# Patient Record
Sex: FEMALE | Race: WHITE | Employment: UNEMPLOYED | URBAN - METROPOLITAN AREA
[De-identification: names, ages, dates, MRNs, and addresses within clinical notes are randomized per-mention and may not be internally consistent; named-entity substitution may affect disease eponyms.]

---

## 2017-01-02 ENCOUNTER — HOSPITAL ENCOUNTER (EMERGENCY)
Age: 37
Discharge: HOME | End: 2017-01-04
Attending: EMERGENCY MEDICINE | Admitting: OBSTETRICS & GYNECOLOGY
Payer: MEDICAID

## 2017-01-02 LAB
ABO + RH BLD: NORMAL
ALBUMIN SERPL BCP-MCNC: 2 G/DL (ref 3.5–5)
ALBUMIN/GLOB SERPL: 0.5 {RATIO} (ref 1.1–2.2)
ALP SERPL-CCNC: 242 U/L (ref 45–117)
ALT SERPL-CCNC: 14 U/L (ref 12–78)
AMPHET UR QL SCN: POSITIVE
ANION GAP BLD CALC-SCNC: 10 MMOL/L (ref 5–15)
AST SERPL W P-5'-P-CCNC: 15 U/L (ref 15–37)
BARBITURATES UR QL SCN: NEGATIVE
BASOPHILS # BLD AUTO: 0 K/UL (ref 0–0.1)
BASOPHILS # BLD: 0 % (ref 0–1)
BENZODIAZ UR QL: NEGATIVE
BILIRUB SERPL-MCNC: 0.3 MG/DL (ref 0.2–1)
BLOOD GROUP ANTIBODIES SERPL: NORMAL
BUN SERPL-MCNC: 9 MG/DL (ref 6–20)
BUN/CREAT SERPL: 12 (ref 12–20)
CALCIUM SERPL-MCNC: 8.4 MG/DL (ref 8.5–10.1)
CANNABINOIDS UR QL SCN: NEGATIVE
CHLORIDE SERPL-SCNC: 98 MMOL/L (ref 97–108)
CO2 SERPL-SCNC: 24 MMOL/L (ref 21–32)
COCAINE UR QL SCN: NEGATIVE
CREAT SERPL-MCNC: 0.77 MG/DL (ref 0.55–1.02)
DRUG SCRN COMMENT,DRGCM: ABNORMAL
EOSINOPHIL # BLD: 0 K/UL (ref 0–0.4)
EOSINOPHIL NFR BLD: 0 % (ref 0–7)
ERYTHROCYTE [DISTWIDTH] IN BLOOD BY AUTOMATED COUNT: 14.1 % (ref 11.5–14.5)
EST. AVERAGE GLUCOSE BLD GHB EST-MCNC: 123 MG/DL
GLOBULIN SER CALC-MCNC: 3.7 G/DL (ref 2–4)
GLUCOSE SERPL-MCNC: 180 MG/DL (ref 65–100)
HBA1C MFR BLD: 5.9 % (ref 4.2–6.3)
HCT VFR BLD AUTO: 36.1 % (ref 35–47)
HGB BLD-MCNC: 11.3 G/DL (ref 11.5–16)
LYMPHOCYTES # BLD AUTO: 6 % (ref 12–49)
LYMPHOCYTES # BLD: 1 K/UL (ref 0.8–3.5)
MCH RBC QN AUTO: 29 PG (ref 26–34)
MCHC RBC AUTO-ENTMCNC: 31.3 G/DL (ref 30–36.5)
MCV RBC AUTO: 92.6 FL (ref 80–99)
METHADONE UR QL: NEGATIVE
MONOCYTES # BLD: 0.7 K/UL (ref 0–1)
MONOCYTES NFR BLD AUTO: 4 % (ref 5–13)
NEUTS SEG # BLD: 16.3 K/UL (ref 1.8–8)
NEUTS SEG NFR BLD AUTO: 90 % (ref 32–75)
OPIATES UR QL: NEGATIVE
PCP UR QL: NEGATIVE
PLATELET # BLD AUTO: 298 K/UL (ref 150–400)
POTASSIUM SERPL-SCNC: 4.3 MMOL/L (ref 3.5–5.1)
PROT SERPL-MCNC: 5.7 G/DL (ref 6.4–8.2)
RBC # BLD AUTO: 3.9 M/UL (ref 3.8–5.2)
SODIUM SERPL-SCNC: 132 MMOL/L (ref 136–145)
SPECIMEN EXP DATE BLD: NORMAL
WBC # BLD AUTO: 18 K/UL (ref 3.6–11)

## 2017-01-02 PROCEDURE — 87491 CHLMYD TRACH DNA AMP PROBE: CPT | Performed by: OBSTETRICS & GYNECOLOGY

## 2017-01-02 PROCEDURE — 86592 SYPHILIS TEST NON-TREP QUAL: CPT | Performed by: OBSTETRICS & GYNECOLOGY

## 2017-01-02 PROCEDURE — 75410000003 HC RECOV DEL/VAG/CSECN EA 0.5 HR: Performed by: OBSTETRICS & GYNECOLOGY

## 2017-01-02 PROCEDURE — 36415 COLL VENOUS BLD VENIPUNCTURE: CPT | Performed by: OBSTETRICS & GYNECOLOGY

## 2017-01-02 PROCEDURE — 86762 RUBELLA ANTIBODY: CPT | Performed by: OBSTETRICS & GYNECOLOGY

## 2017-01-02 PROCEDURE — 87340 HEPATITIS B SURFACE AG IA: CPT | Performed by: OBSTETRICS & GYNECOLOGY

## 2017-01-02 PROCEDURE — 83036 HEMOGLOBIN GLYCOSYLATED A1C: CPT | Performed by: OBSTETRICS & GYNECOLOGY

## 2017-01-02 PROCEDURE — 59409 OBSTETRICAL CARE: CPT

## 2017-01-02 PROCEDURE — 74011250636 HC RX REV CODE- 250/636: Performed by: OBSTETRICS & GYNECOLOGY

## 2017-01-02 PROCEDURE — 75810000280 HC DELIVERY OF PLACENTA

## 2017-01-02 PROCEDURE — 80053 COMPREHEN METABOLIC PANEL: CPT | Performed by: OBSTETRICS & GYNECOLOGY

## 2017-01-02 PROCEDURE — 87389 HIV-1 AG W/HIV-1&-2 AB AG IA: CPT | Performed by: OBSTETRICS & GYNECOLOGY

## 2017-01-02 PROCEDURE — 85025 COMPLETE CBC W/AUTO DIFF WBC: CPT | Performed by: OBSTETRICS & GYNECOLOGY

## 2017-01-02 PROCEDURE — 74011250637 HC RX REV CODE- 250/637: Performed by: OBSTETRICS & GYNECOLOGY

## 2017-01-02 PROCEDURE — 99283 EMERGENCY DEPT VISIT LOW MDM: CPT

## 2017-01-02 PROCEDURE — 88307 TISSUE EXAM BY PATHOLOGIST: CPT | Performed by: EMERGENCY MEDICINE

## 2017-01-02 PROCEDURE — 96365 THER/PROPH/DIAG IV INF INIT: CPT

## 2017-01-02 PROCEDURE — 86803 HEPATITIS C AB TEST: CPT | Performed by: OBSTETRICS & GYNECOLOGY

## 2017-01-02 PROCEDURE — 86850 RBC ANTIBODY SCREEN: CPT | Performed by: OBSTETRICS & GYNECOLOGY

## 2017-01-02 PROCEDURE — 87798 DETECT AGENT NOS DNA AMP: CPT | Performed by: OBSTETRICS & GYNECOLOGY

## 2017-01-02 PROCEDURE — 80307 DRUG TEST PRSMV CHEM ANLYZR: CPT | Performed by: OBSTETRICS & GYNECOLOGY

## 2017-01-02 RX ORDER — ONDANSETRON 4 MG/1
4 TABLET, ORALLY DISINTEGRATING ORAL
Status: DISCONTINUED | OUTPATIENT
Start: 2017-01-02 | End: 2017-01-04 | Stop reason: HOSPADM

## 2017-01-02 RX ORDER — HYDROCORTISONE ACETATE PRAMOXINE HCL 2.5; 1 G/100G; G/100G
CREAM TOPICAL AS NEEDED
Status: DISCONTINUED | OUTPATIENT
Start: 2017-01-02 | End: 2017-01-04 | Stop reason: HOSPADM

## 2017-01-02 RX ORDER — SIMETHICONE 80 MG
80 TABLET,CHEWABLE ORAL
Status: DISCONTINUED | OUTPATIENT
Start: 2017-01-02 | End: 2017-01-04 | Stop reason: HOSPADM

## 2017-01-02 RX ORDER — IBUPROFEN 800 MG/1
800 TABLET ORAL EVERY 8 HOURS
Status: DISCONTINUED | OUTPATIENT
Start: 2017-01-02 | End: 2017-01-04 | Stop reason: HOSPADM

## 2017-01-02 RX ORDER — OXYTOCIN/RINGER'S LACTATE 20/1000 ML
125-500 PLASTIC BAG, INJECTION (ML) INTRAVENOUS ONCE
Status: ACTIVE | OUTPATIENT
Start: 2017-01-02 | End: 2017-01-02

## 2017-01-02 RX ORDER — DOCUSATE SODIUM 100 MG/1
100 CAPSULE, LIQUID FILLED ORAL
Status: DISCONTINUED | OUTPATIENT
Start: 2017-01-02 | End: 2017-01-04 | Stop reason: HOSPADM

## 2017-01-02 RX ORDER — OXYTOCIN IN 5 % DEXTROSE 30/500 ML
999 PLASTIC BAG, INJECTION (ML) INTRAVENOUS
Status: COMPLETED | OUTPATIENT
Start: 2017-01-02 | End: 2017-01-02

## 2017-01-02 RX ORDER — SWAB
1 SWAB, NON-MEDICATED MISCELLANEOUS DAILY
Status: DISCONTINUED | OUTPATIENT
Start: 2017-01-03 | End: 2017-01-04 | Stop reason: HOSPADM

## 2017-01-02 RX ORDER — NALOXONE HYDROCHLORIDE 0.4 MG/ML
0.4 INJECTION, SOLUTION INTRAMUSCULAR; INTRAVENOUS; SUBCUTANEOUS AS NEEDED
Status: DISCONTINUED | OUTPATIENT
Start: 2017-01-02 | End: 2017-01-04 | Stop reason: HOSPADM

## 2017-01-02 RX ORDER — IBUPROFEN 200 MG
800 TABLET ORAL
COMMUNITY
End: 2017-01-04

## 2017-01-02 RX ORDER — LANOLIN ALCOHOL/MO/W.PET/CERES
1 CREAM (GRAM) TOPICAL
Status: DISCONTINUED | OUTPATIENT
Start: 2017-01-03 | End: 2017-01-04 | Stop reason: HOSPADM

## 2017-01-02 RX ADMIN — IBUPROFEN 800 MG: 800 TABLET, FILM COATED ORAL at 19:28

## 2017-01-02 RX ADMIN — Medication 999 ML/HR: at 11:06

## 2017-01-02 NOTE — ED PROVIDER NOTES
HPI Comments: 29 y.o. female with past medical history significant for hx of chlamydia and cocaine abuse who presents from home via EMS with chief complaint of abdominal cramping. Pt complains of abdominal cramping with associated vaginal bleeding and abdominal distension that began last night. Pt reports LMP was ~3 months PTA. Pt has hx of 1  and 1 vaginal delivery. G/P/A: 3/2/0. There are no other acute medical concerns at this time. Precipitious Delivery-10:51 AM.     Social hx: (+) tobacco smoker; occasional EtOH     Note written by Beronica Castillo. Marianne Bhatt, as dictated by Jania Martinez MD 10:58 AM      The history is provided by the patient. No  was used. Past Medical History:   Diagnosis Date    History of cocaine use     Hx of abnormal Pap smear     Hx of chlamydia infection        Past Surgical History:   Procedure Laterality Date    Hx  section           No family history on file. Social History     Social History    Marital status:      Spouse name: N/A    Number of children: N/A    Years of education: N/A     Occupational History    Not on file. Social History Main Topics    Smoking status: Current Every Day Smoker     Packs/day: 1.00     Years: 15.00    Smokeless tobacco: Not on file    Alcohol use Yes    Drug use: Yes    Sexual activity: Yes     Partners: Male     Birth control/ protection: None     Other Topics Concern    Not on file     Social History Narrative         ALLERGIES: Latex and Codeine    Review of Systems   Constitutional: Negative for fever. Gastrointestinal: Positive for abdominal pain (w/ distension). Genitourinary: Positive for vaginal bleeding. Vitals:    17 1055   BP: 138/86   Pulse: 73   Resp: 19   Temp: 97.7 °F (36.5 °C)   SpO2: 100%   Weight: 54.9 kg (121 lb)   Height: 5' 6\" (1.676 m)            Physical Exam   Constitutional: She is oriented to person, place, and time.  She appears well-developed and well-nourished. Uncomfortable appearing, AxOx4, speaking in complete sentences, appears gravid;    HENT:   Head: Normocephalic and atraumatic. Nose: Nose normal.   Mouth/Throat: Oropharynx is clear and moist.   Eyes: Conjunctivae and EOM are normal. Pupils are equal, round, and reactive to light. Right eye exhibits no discharge. Left eye exhibits no discharge. No scleral icterus. Neck: Normal range of motion. Neck supple. No JVD present. No tracheal deviation present. Cardiovascular: Normal rate, regular rhythm, normal heart sounds and intact distal pulses. Exam reveals no gallop and no friction rub. No murmur heard. Pulmonary/Chest: Effort normal and breath sounds normal. No respiratory distress. She has no wheezes. She has no rales. She exhibits no tenderness. Abdominal: Soft. Bowel sounds are normal. There is no tenderness. There is no rebound and no guarding. Gravid/ nttp   Genitourinary: No vaginal discharge found. Genitourinary Comments: Pt denies urinary  Complaints    NEFG/ Noted tuft of hair/ vb; baby delivered;    Musculoskeletal: Normal range of motion. She exhibits no edema or tenderness. Neurological: She is alert and oriented to person, place, and time. No cranial nerve deficit. She exhibits normal muscle tone. Coordination normal.   Skin: Skin is warm and dry. No rash noted. No erythema. No pallor. Psychiatric: She has a normal mood and affect. Her behavior is normal. Thought content normal.   Nursing note and vitals reviewed.        [facility]  ED Course       Procedures    Procedure Note - Delivery Note:    10:59 AM  Performed by: Kelly Price MD  Chaperoned by: Nurse  Pelvic exam was performed/ noted tuft of hair/ no cord around neck/  precipitious delivery of apparent term male baby/ Apgar 6-8 at 1min; Cord clamped/ cut/  nose/ mouth suctioned; code white called;      12:16 PM  Patient is being evaluated for admission to the hospital by the [facility] hospitalist/ Baby to the NICU. The results of their tests and reasons for their admission have been discussed with them and/or available family. They convey agreement and understanding for the need to be admitted and for their admission diagnosis. Consultation has been made with the inpatient physician specialist for hospitalization.

## 2017-01-02 NOTE — PROGRESS NOTES
1125- Patient arrived on unit after code white was called and patient delivered in ER. Fundus check was performed on admission, firm at U with small rubra bleeding.

## 2017-01-02 NOTE — IP AVS SNAPSHOT
303 [facility] 
 
 
 [address] 
[phone] Patient: Will Stephens MRN: [id number] RYL:9/17/1799 You are allergic to the following Allergen Reactions Latex Rash Codeine Rash Immunizations Administered for This Admission Name Date Influenza Vaccine (Quad) PF 1/4/2017 Recent Documentation Height Weight BMI OB Status Smoking Status 1.676 m 54.9 kg 19.53 kg/m2 Pregnant Current Every Day Smoker Unresulted Labs Order Current Status Joce Amend / GC AMPLIFICATION In process VARICELLA ZOSTER BY PCR In process Emergency Contacts Name Discharge Info Relation Home Work Mobile Lloyd Castano DISCHARGE CAREGIVER [3] Spouse [3] [phone] About your hospitalization You were admitted on:  N/A You last received care in the:  [facility] 2 LABOR & DELIVERY You were discharged on:  January 4, 2017 Unit phone number:  [phone] Why you were hospitalized Your primary diagnosis was:  Not on File Providers Seen During Your Hospitalizations Provider Role Specialty Primary office phone Obdulio Booth MD Attending Provider Emergency Medicine [phone] Jessi Key MD Attending Provider Obstetrics & Gynecology [phone] Your Primary Care Physician (PCP) Primary Care Physician Office Phone Office Fax NONE ** None ** ** None ** Follow-up Information Follow up With Details Comments Contact Info Serafin Caro MD Schedule an appointment as soon as possible for a visit in 2 weeks early post-partum visit due to complicated hx. Need to follow up labs and BP.  [address] [facility] [address] 
[phone] [address] [address] Schedule an appointment as soon as possible for a visit in 6 weeks  Michelle Elena [address] 
[phone] Current Discharge Medication List  
  
START taking these medications Dose & Instructions Dispensing Information Comments Morning Noon Evening Bedtime  
 docusate sodium 100 mg capsule Commonly known as:  Gevena Living Your next dose is: Today, Tomorrow Other:  _________ Dose:  100 mg Take 1 Cap by mouth two (2) times daily as needed for Constipation for up to 90 days. Quantity:  30 Cap Refills:  0  
     
   
   
   
  
 ferrous sulfate 325 mg (65 mg iron) tablet Your next dose is: Today, Tomorrow Other:  _________ Dose:  325 mg Take 1 Tab by mouth three (3) times daily (with meals). Quantity:  60 Tab Refills:  2  
     
   
   
   
  
 prenatal vit-iron fumarate-fa 28 mg iron- 800 mcg Tab Commonly known as:  PRENATAL PLUS with IRON Your next dose is: Today, Tomorrow Other:  _________ Dose:  1 Tab Take 1 Tab by mouth daily. Quantity:  60 Tab Refills:  1 CONTINUE these medications which have CHANGED Dose & Instructions Dispensing Information Comments Morning Noon Evening Bedtime  
 ibuprofen 800 mg tablet Commonly known as:  MOTRIN What changed:   
- medication strength - when to take this Your next dose is: Today, Tomorrow Other:  _________ Dose:  800 mg Take 1 Tab by mouth every eight (8) hours. Quantity:  20 Tab Refills:  0 STOP taking these medications   
 azithromycin 250 mg tablet Commonly known as:  ZITHROMAX  
   
  
 calcium carbonate 200 mg calcium (500 mg) Chew Commonly known as:  TUMS  
   
  
 oxyCODONE-acetaminophen 5-325 mg per tablet Commonly known as:  PERCOCET  
   
  
 PRENATAL DHA+COMPLETE PRENATAL -300 mg-mcg-mg Cmpk Generic drug:  PNV no.24-iron-folic acid-dha  
   
  
 TYLENOL 325 mg tablet Generic drug:  acetaminophen Where to Get Your Medications Information on where to get these meds will be given to you by the nurse or doctor. ! Ask your nurse or doctor about these medications  
  docusate sodium 100 mg capsule  
 ferrous sulfate 325 mg (65 mg iron) tablet  
 ibuprofen 800 mg tablet  
 prenatal vit-iron fumarate-fa 28 mg iron- 800 mcg Tab Discharge Instructions Patient Discharge Instructions Melody Simon Boxer / [id number] : 1980 Admitted 2017 Discharged: 2017 Please bring this form with you to show your care provider at your follow-up appointment. Primary care provider:  None Discharging provider:  Maru Graf MD  - Family Medicine Resident [address] [facility] Hospitalist Attending ACUTE DIAGNOSES: 
Maternity FOLLOW-UP CARE RECOMMENDATIONS: 
 
Follow-up Information Follow up With Details Comments Contact Info Carmelina Vivas MD Schedule an appointment as soon as possible for a visit in 2 weeks early post-partum visit due to complicated hx. Need to follow up labs and BP.  [address] [facility] [address] 
[phone] [address] [address] Schedule an appointment as soon as possible for a visit in 6 weeks  Michelle Popsus [address] 
[phone] Continuing Therapy: 
- Please continue Motrin 800 mg, 1 tablet every 8 hours for the next 2 days, then 1 tablet every 8 hours as needed for pain - Please continue Colace 100 mg for constipation, take one tablet BID until having regular bowel movements - Please start Ferrous Sulfate 325 mg for anemia, Take 1 tablet 2 times daily - Please continue your prenatal vitamins Follow-up tests needed: BP check necessary as pt had elevated BP. CBC for anemia. Further tests to be determined by PCP/OB Pending test results: At the time of your discharge the following test results are still pending: varicella, GC/chlamydia, HepBsAg, HepCAb Please make sure you review these results with your outpatient follow-up provider(s). Specific symptoms to watch for: chest pain, shortness of breath, fever, chills, nausea, vomiting, diarrhea, change in mentation, falling, weakness, bleeding. DIET/what to eat:  Regular Diet ACTIVITY:  
Activity Instructions Do not lift anything heavier than your baby for 6 weeks. Nothing in the vagina for 6 weeks. You are ok to drive as long as you are not taking Percocet or other narcotic pain medication (Motrin is ok). Wound care: None I understand that if any problems occur once I am at home I am to contact my physician. These instructions were explained to me and I had the opportunity to ask questions. I understand and acknowledge receipt of the instructions indicated above. Physician's or R.N.'s Signature                                                                  Date/Time Patient or Representative Signature                                                          Date/Time Vaginal Childbirth: Care Instructions Your Care Instructions Your body will slowly heal in the next few weeks. It is easy to get too tired and overwhelmed during the first weeks after your baby is born. Changes in your hormones can shift your mood without warning. You may find it hard to meet the extra demands on your energy and time. Take it easy on yourself. Follow-up care is a key part of your treatment and safety. Be sure to make and go to all appointments, and call your doctor if you are having problems. It's also a good idea to know your test results and keep a list of the medicines you take. How can you care for yourself at home? · Vaginal bleeding and cramps ¨ After delivery, you will have a bloody discharge from the vagina. This will turn pink within a week and then white or yellow after about 10 days. It may last for 2 to 4 weeks or longer, until the uterus has healed. Use pads instead of tampons until you stop bleeding. ¨ Do not worry if you pass some blood clots, as long as they are smaller than a golf ball. If you have a tear or stitches in your vaginal area, change the pad at least every 4 hours to prevent soreness and infection. ¨ You may have cramps for the first few days after childbirth. These are normal and occur as the uterus shrinks to normal size. Take an over-the-counter pain medicine, such as acetaminophen (Tylenol), ibuprofen (Advil, Motrin), or naproxen (Aleve), for cramps. Read and follow all instructions on the label. Do not take aspirin, because it can cause more bleeding. ¨ Do not take two or more pain medicines at the same time unless the doctor told you to. Many pain medicines have acetaminophen, which is Tylenol. Too much acetaminophen (Tylenol) can be harmful. · Stitches ¨ If you have stitches, they will dissolve on their own and do not need to be removed. Follow your doctor's instructions for cleaning the stitched area. ¨ Put ice or a cold pack on your painful area for 10 to 20 minutes at a time, several times a day, for the first few days. Put a thin cloth between the ice and your skin. ¨ Sit in a few inches of warm water (sitz bath) 3 times a day and after bowel movements. The warm water helps with pain and itching. If you do not have a tub, a warm shower might help. · Breast fullness ¨ Your breasts may overfill (engorge) in the first few days after delivery. To help milk flow and to relieve pain, warm your breasts in the shower or by using warm, moist towels before nursing.  
¨ If you are not nursing, do not put warmth on your breasts or touch your breasts. Wear a tight bra or sports bra and use ice until the fullness goes away. This usually takes 2 to 3 days. ¨ Put ice or a cold pack on your breast after nursing to reduce swelling and pain. Put a thin cloth between the ice and your skin. · Activity ¨ Eat a balanced diet. Do not try to lose weight by cutting calories. Keep taking your prenatal vitamins, or take a multivitamin. ¨ Get as much rest as you can. Try to take naps when your baby sleeps during the day. ¨ Get some exercise every day. But do not do any heavy exercise until your doctor says it is okay. ¨ Wait until you are healed (about 4 to 6 weeks) before you have sexual intercourse. Your doctor will tell you when it is okay to have sex. ¨ Talk to your doctor about birth control. You can get pregnant even before your period returns. Also, you can get pregnant while you are breastfeeding. · Mental health ¨ It is normal to have some sadness, anxiety, sleeplessness, and mood swings after you go home. If you feel upset or hopeless for more than a few days or are having trouble doing the things you need to do, talk to your doctor. · Constipation and hemorrhoids ¨ Drink plenty of fluids, enough so that your urine is light yellow or clear like water. If you have kidney, heart, or liver disease and have to limit fluids, talk with your doctor before you increase the amount of fluids you drink. ¨ Eat plenty of fiber each day. Have a bran muffin or bran cereal for breakfast, and try eating a piece of fruit for a mid-afternoon snack. ¨ For painful, itchy hemorrhoids, put ice or a cold pack on the area several times a day for 10 minutes at a time. Follow this by putting a warm compress on the area for another 10 to 20 minutes or by sitting in a shallow, warm bath. When should you call for help? Call 911 anytime you think you may need emergency care. For example, call if: 
· You are thinking of hurting yourself, your baby, or anyone else. · You have sudden, severe pain in your belly. · You passed out (lost consciousness). Call your doctor now or seek immediate medical care if: 
· You have severe vaginal bleeding. · You are soaking through a pad each hour for 2 or more hours. · Your vaginal bleeding seems to be getting heavier or is still bright red 4 days after delivery. · You are dizzy or lightheaded, or you feel like you may faint. · You are vomiting or cannot keep fluids down. · You have a fever. · You have new or more belly pain. · You pass tissue (not just blood). · Your vaginal discharge smells bad. · Your belly feels tender or full and hard. · Your breasts are continuously painful or red. · You feel sad, anxious, or hopeless for more than a few days. Watch closely for changes in your health, and be sure to contact your doctor if you have any problems. Where can you learn more? Go to http://jada-mo.info/. Enter Z943 in the search box to learn more about \"Vaginal Childbirth: Care Instructions. \" Current as of: May 30, 2016 Content Version: 11.1 © 3517-9752 [employer]. Care instructions adapted under license by CoreFlow (which disclaims liability or warranty for this information). If you have questions about a medical condition or this instruction, always ask your healthcare professional. [address] any warranty or liability for your use of this information. Secondhand Smoke in Children: Care Instructions Your Care Instructions Secondhand smoke comes from the burning end of a cigarette, cigar, or pipe and the smoke that a smoker exhales. The smoke contains nicotine and many other harmful chemicals. Breathing secondhand smoke can cause or worsen health problems including cancer, asthma, coronary artery disease, and respiratory infections. It can make your child's eyes and nose burn and cause a sore throat. Secondhand smoke is especially bad for babies and young children whose lungs are still developing. Babies whose parents smoke are more likely to have ear infections, pneumonia, and bronchitis in the first few years of their lives. Secondhand smoke can make asthma symptoms worse in children. Follow-up care is a key part of your child's treatment and safety. Be sure to make and go to all appointments, and call your doctor if your child is having problems. It's also a good idea to know your child's test results and keep a list of the medicines your child takes. How can you care for your child at home? · Do not smoke or let anyone else smoke in your home. If people must smoke, ask them to go outside. · If people do smoke in your home, choose a room where you can open a window or use a fan to get the smoke outside. · Do not let anyone smoke in your car. If someone must smoke, pull over in a safe place and let the person smoke away from the car. · Make sure that your children are not exposed to secondhand smoke at day care, school, and after-school programs. · Try to choose nonsmoking restaurants and other public places when you go out with your children. · Help your family and friends who smoke to quit by encouraging them to try. Tell them about treatment resources. Having support from others often helps. · If you smoke, quit. Quitting is hard, but there are ways to boost your chance of quitting tobacco for good. ¨ Use nicotine gum, patches, or lozenges. Call a quitline. Ask your doctor about stop-smoking programs and medicines. ¨ Keep trying. When should you call for help? Watch closely for changes in your child's health, and be sure to contact your doctor if your child has any problems. Where can you learn more? Go to http://jada-mo.info/. Enter K958 in the search box to learn more about \"Secondhand Smoke in Children: Care Instructions. \" Current as of: May 26, 2016 Content Version: 11.1 © 2797-1485 [employer]. Care instructions adapted under license by PlayyOn (which disclaims liability or warranty for this information). If you have questions about a medical condition or this instruction, always ask your healthcare professional. [address] any warranty or liability for your use of this information. Stopping Smoking: Care Instructions Your Care Instructions Cigarette smokers crave the nicotine in cigarettes. Giving it up is much harder than simply changing a habit. Your body has to stop craving the nicotine. It is hard to quit, but you can do it. There are many tools that people use to quit smoking. You may find that combining tools works best for you. There are several steps to quitting. First you get ready to quit. Then you get support to help you. After that, you learn new skills and behaviors to become a nonsmoker. For many people, a necessary step is getting and using medicine. Your doctor will help you set up the plan that best meets your needs. You may want to attend a smoking cessation program to help you quit smoking. When you choose a program, look for one that has proven success. Ask your doctor for ideas. You will greatly increase your chances of success if you take medicine as well as get counseling or join a cessation program. 
Some of the changes you feel when you first quit tobacco are uncomfortable. Your body will miss the nicotine at first, and you may feel short-tempered and grumpy. You may have trouble sleeping or concentrating. Medicine can help you deal with these symptoms. You may struggle with changing your smoking habits and rituals. The last step is the tricky one: Be prepared for the smoking urge to continue for a time. This is a lot to deal with, but keep at it. You will feel better. Follow-up care is a key part of your treatment and safety.  Be sure to make and go to all appointments, and call your doctor if you are having problems. Its also a good idea to know your test results and keep a list of the medicines you take. How can you care for yourself at home? · Ask your family, friends, and coworkers for support. You have a better chance of quitting if you have help and support. · Join a support group, such as Nicotine Anonymous, for people who are trying to quit smoking. · Consider signing up for a smoking cessation program, such as the American Lung Association's Freedom from Smoking program. 
· Set a quit date. Pick your date carefully so that it is not right in the middle of a big deadline or stressful time. Once you quit, do not even take a puff. Get rid of all ashtrays and lighters after your last cigarette. Clean your house and your clothes so that they do not smell of smoke. · Learn how to be a nonsmoker. Think about ways you can avoid those things that make you reach for a cigarette. ¨ Avoid situations that put you at greatest risk for smoking. For some people, it is hard to have a drink with friends without smoking. For others, they might skip a coffee break with coworkers who smoke. ¨ Change your daily routine. Take a different route to work or eat a meal in a different place. · Cut down on stress. Calm yourself or release tension by doing an activity you enjoy, such as reading a book, taking a hot bath, or gardening. · Talk to your doctor or pharmacist about nicotine replacement therapy, which replaces the nicotine in your body. You still get nicotine but you do not use tobacco. Nicotine replacement products help you slowly reduce the amount of nicotine you need. These products come in several forms, many of them available over-the-counter: ¨ Nicotine patches ¨ Nicotine gum and lozenges ¨ Nicotine inhaler · Ask your doctor about bupropion (Wellbutrin) or varenicline (Chantix), which are prescription medicines. They do not contain nicotine. They help you by reducing withdrawal symptoms, such as stress and anxiety. · Some people find hypnosis, acupuncture, and massage helpful for ending the smoking habit. · Eat a healthy diet and get regular exercise. Having healthy habits will help your body move past its craving for nicotine. · Be prepared to keep trying. Most people are not successful the first few times they try to quit. Do not get mad at yourself if you smoke again. Make a list of things you learned and think about when you want to try again, such as next week, next month, or next year. Where can you learn more? Go to http://jadaZakazakamo.info/. Enter L609 in the search box to learn more about \"Stopping Smoking: Care Instructions. \" Current as of: May 26, 2016 Content Version: 11.1 © 8773-4312 [employer]. Care instructions adapted under license by Swift Shift (which disclaims liability or warranty for this information). If you have questions about a medical condition or this instruction, always ask your healthcare professional. [address] any warranty or liability for your use of this information. Discharge Orders None CodeSealer Announcement We are excited to announce that we are making your provider's discharge notes available to you in CodeSealer. You will see these notes when they are completed and signed by the physician that discharged you from your recent hospital stay. If you have any questions or concerns about any information you see in CodeSealer, please call the Health Information Department where you were seen or reach out to your Primary Care Provider for more information about your plan of care. Introducing [facility]!    
 [facility] introduces CodeSealer patient portal. Now you can access parts of your medical record, email your doctor's office, and request medication refills online. 1. In your internet browser, go to [email or web address] 2. Click on the First Time User? Click Here link in the Sign In box. You will see the New Member Sign Up page. 3. Enter your CareerImp Access Code exactly as it appears below. You will not need to use this code after youve completed the sign-up process. If you do not sign up before the expiration date, you must request a new code. · CareerImp Access Code: [email or web address] Expires: 4/2/2017 11:54 AM 
 
4. Enter the last four digits of your Social Security Number (xxxx) and Date of Birth (mm/dd/yyyy) as indicated and click Submit. You will be taken to the next sign-up page. 5. Create a CareerImp ID. This will be your CareerImp login ID and cannot be changed, so think of one that is secure and easy to remember. 6. Create a CareerImp password. You can change your password at any time. 7. Enter your Password Reset Question and Answer. This can be used at a later time if you forget your password. 8. Enter your e-mail address. You will receive e-mail notification when new information is available in [address] 9. Click Sign Up. You can now view and download portions of your medical record. 10. Click the Download Summary menu link to download a portable copy of your medical information. If you have questions, please visit the Frequently Asked Questions section of the CareerImp website. Remember, CareerImp is NOT to be used for urgent needs. For medical emergencies, dial 911. Now available from your iPhone and Android! General Information Please provide this summary of care documentation to your next provider. Patient Signature:  ____________________________________________________________ Date:  ____________________________________________________________  
  
[facility] Provider Signature:  ____________________________________________________________ Date:  ____________________________________________________________

## 2017-01-02 NOTE — IP AVS SNAPSHOT
Current Discharge Medication List  
  
Take these medications at their scheduled times Dose & Instructions Dispensing Information Comments Morning Noon Evening Bedtime  
 ferrous sulfate 325 mg (65 mg iron) tablet Your next dose is: Today, Tomorrow Other:  ____________ Dose:  325 mg Take 1 Tab by mouth three (3) times daily (with meals). Quantity:  60 Tab Refills:  2  
     
   
   
   
  
 ibuprofen 800 mg tablet Commonly known as:  MOTRIN Your next dose is: Today, Tomorrow Other:  ____________ Dose:  800 mg Take 1 Tab by mouth every eight (8) hours. Quantity:  20 Tab Refills:  0  
     
   
   
   
  
 prenatal vit-iron fumarate-fa 28 mg iron- 800 mcg Tab Commonly known as:  PRENATAL PLUS with IRON Your next dose is: Today, Tomorrow Other:  ____________ Dose:  1 Tab Take 1 Tab by mouth daily. Quantity:  60 Tab Refills:  1 Take these medications as needed Dose & Instructions Dispensing Information Comments Morning Noon Evening Bedtime  
 docusate sodium 100 mg capsule Commonly known as:  Myrna Moyer Your next dose is: Today, Tomorrow Other:  ____________ Dose:  100 mg Take 1 Cap by mouth two (2) times daily as needed for Constipation for up to 90 days. Quantity:  30 Cap Refills:  0 Where to Get Your Medications Information about where to get these medications is not yet available ! Ask your nurse or doctor about these medications  
  docusate sodium 100 mg capsule  
 ferrous sulfate 325 mg (65 mg iron) tablet  
 ibuprofen 800 mg tablet  
 prenatal vit-iron fumarate-fa 28 mg iron- 800 mcg Tab

## 2017-01-02 NOTE — H&P
Labor and Delivery Admission Note  2017    29 y.o., , female, G3 P 2 Unknown AMBER, No Prenatal care  at 0  In ED transported  by EMS, reportedly LMP 3 months AGO, G1 , G2  Section, G3 current- no prenatal care Precipitous Delivery In ED by DR. Russell. I Responded to Code White, and upon my arrival VMI had already been delivered by ED Physician, reportedly uncomplicated Cephalic Vertex Presentation. Upon my arrival Patient lying on [place] with IV running, Umbilical cord clamped at vagina in soaked Cu=hux pad approximately 500 cc EBL. Reports Recent h/o Sisi abccess treated with antibiotics and Steroids. She also reports recent use of Motrin. Recent h/o Methamphetamine use per EMS. Patient reports using sudafed. And Adderal.    PNC: Blood type: Unknown            RH: unknown            Rubella: unknown            SVII serology: Unknown             GBS status: Unkown  Past Medical History   Diagnosis Date    History of cocaine use     Hx of abnormal Pap smear     Hx of chlamydia infection      Past Surgical History   Procedure Laterality Date    Hx  section       OB/GYN: No Prenatal care  Meds:   No current facility-administered medications for this encounter. Allergies: Allergies   Allergen Reactions    Latex Rash    Codeine Rash     Pertinent ROS: see HPI   No family history on file. Social History     Social History    Marital status:      Spouse name: N/A    Number of children: N/A    Years of education: N/A     Occupational History    Not on file. Social History Main Topics    Smoking status: Current Every Day Smoker     Packs/day: 1.00     Years: 15.00    Smokeless tobacco: Not on file    Alcohol use Yes    Drug use:  Yes    Sexual activity: Yes     Partners: Male     Birth control/ protection: None     Other Topics Concern    Not on file     Social History Narrative       OBJECTIVE:  Gravid , female NAD  Temp (24hrs), Av.7 °F (36.5 °C), Min:97.7 °F (36.5 °C), Max:97.7 °F (36.5 °C)    Visit Vitals    /89    Pulse 73    Temp 97.7 °F (36.5 °C)    Resp 19    Ht 5' 6\" (1.676 m)    Wt 54.9 kg (121 lb)    SpO2 99%    BMI 19.53 kg/m2       Labs:    Lab Results   Component Value Date/Time    WBC 11.4 10/11/2015 06:45 AM       Exam:  gEn A/o x3 NAD  VSS afebrile  Abdomen Soft Mildly TTP  Fundus at Umbilicus  V/V- placenta at vagina with clamp, approximately 500 EBL  VMI-being attended to by Peds- s/p Precipitous delivery By ED Physician   Procedure:  Placenta Delivered Spontaneously intact with gentel traction and fundal massage  Vagina and perineum appeared to be intact and did not require a repair   Pitocin started and Patient was transported to L&D in stable condition    Impression:1.  Precipitous delivery of VMI of Unknown Gestational age                      3. No Prenatal Care                      3. H/o Drug abuse Meth/Cocaine                      4. Recent h/o Oral Abccess- treated with Antibiotics and Steroids    Plan: Continue Pitocin IV            Routine Prenatal Care            Prenatal Labs            UDS            Close observation for Possible Detox given h/o Drug abuse               Jessi Key MD

## 2017-01-02 NOTE — ED NOTES
Code white called - L&D nurses, NICU nurses, and OB hospitalist at bedside. 11:15 AM  Fundus delivered by [facility] hospitalist, pt cleaned up, and transported upstairs by L&D nurses. Pt's VSS.

## 2017-01-02 NOTE — PROGRESS NOTES
Late Entry:    1050: Delivery of viable male in ED room #11 by Dr Oumar Andino  [phone]: Tony Schneider, RNC, Arnulfo Hodges, RN, and Edel Sanchez, [facility] in ED #11 to assess mother and   As reported by patient: LMP 3 months AGO, G1 , G2  Section, G3 current  Mother verbalized she did not know she was pregnant  Pt immediately verbalized concern that she had been taking Motrin orally for months (reported by Aleshia Bennett RN to Alhaji Olivarez RN)  Tactile stimulation provided to ,  immediately began to cry    handed off to Sher Zelaya RN and NICU team  [phone]:  Dr Mohsen Boggs [facility] Hospitalist) at bedside

## 2017-01-03 LAB
BASOPHILS # BLD AUTO: 0 K/UL (ref 0–0.1)
BASOPHILS # BLD: 0 % (ref 0–1)
EOSINOPHIL # BLD: 0.1 K/UL (ref 0–0.4)
EOSINOPHIL NFR BLD: 1 % (ref 0–7)
ERYTHROCYTE [DISTWIDTH] IN BLOOD BY AUTOMATED COUNT: 14 % (ref 11.5–14.5)
HCT VFR BLD AUTO: 28.8 % (ref 35–47)
HGB BLD-MCNC: 9.2 G/DL (ref 11.5–16)
HIV1 P24 AG SERPL QL IA: NONREACTIVE
HIV1+2 AB SERPL QL IA: NONREACTIVE
LYMPHOCYTES # BLD AUTO: 16 % (ref 12–49)
LYMPHOCYTES # BLD: 1.7 K/UL (ref 0.8–3.5)
MCH RBC QN AUTO: 29.4 PG (ref 26–34)
MCHC RBC AUTO-ENTMCNC: 31.9 G/DL (ref 30–36.5)
MCV RBC AUTO: 92 FL (ref 80–99)
MONOCYTES # BLD: 0.7 K/UL (ref 0–1)
MONOCYTES NFR BLD AUTO: 7 % (ref 5–13)
NEUTS SEG # BLD: 8.2 K/UL (ref 1.8–8)
NEUTS SEG NFR BLD AUTO: 76 % (ref 32–75)
PLATELET # BLD AUTO: 327 K/UL (ref 150–400)
RBC # BLD AUTO: 3.13 M/UL (ref 3.8–5.2)
RPR SER QL: NONREACTIVE
WBC # BLD AUTO: 10.7 K/UL (ref 3.6–11)

## 2017-01-03 PROCEDURE — 36415 COLL VENOUS BLD VENIPUNCTURE: CPT | Performed by: OBSTETRICS & GYNECOLOGY

## 2017-01-03 PROCEDURE — 74011250637 HC RX REV CODE- 250/637: Performed by: OBSTETRICS & GYNECOLOGY

## 2017-01-03 PROCEDURE — 85025 COMPLETE CBC W/AUTO DIFF WBC: CPT | Performed by: OBSTETRICS & GYNECOLOGY

## 2017-01-03 RX ADMIN — IBUPROFEN 800 MG: 800 TABLET, FILM COATED ORAL at 05:29

## 2017-01-03 RX ADMIN — IBUPROFEN 800 MG: 800 TABLET, FILM COATED ORAL at 16:15

## 2017-01-03 RX ADMIN — IBUPROFEN 800 MG: 800 TABLET, FILM COATED ORAL at 23:36

## 2017-01-03 RX ADMIN — IRON 325 MG: 65 TABLET ORAL at 16:15

## 2017-01-03 RX ADMIN — Medication 1 TABLET: at 16:15

## 2017-01-03 NOTE — PROGRESS NOTES
17 12:04 PM  CM met with JULISSA to complete initial assessment, address consults, and begin discharge planning. Patient demographics confirmed. MOB present with /FOLloyd GOODEN. [address] address is PAULINA's mother's address where the couple and their children (6 year old daughter, 16 month old son) previously lived. It was reported that they left this home (they were kicked out by PAULINA's mother) on  and moved in with JULISSA's mother and father on the night of  at the [place] [address] address. The phone number listed ([phone]) is JULISSA's mother's phone number. PAULINA had an iPhone in his hands during this interview, but claimed that he does not have a cell phone. PAULINA is \"in between\" jobs, which he reported now that he is happy about so that he can spend time with MOB and the new baby. MOB and FOB reported that they have everything they need for the baby, as they have been given items from MOB's sister and PAULINA's sister and have leftover items from their youngest child. JULISSA does not currently have WIC and any benefits, she was educated on how to pursue these services and given contact numbers to schedule appointments. JULISSA has Medicaid and is aware of the process to add the . JULISSA reported that she is bottlefeeding formula. JULISSA had no prenatal care during this pregnancy. JULISSA reported that she did not know she was pregnant until the night of 17 when she began having intense pain and cramping, she reported that she thought she was having a miscarriage. MOB and FOB noted that they suspected that JULISSA was pregnant \"a few times\" but never did anything about finding out. PAULINA expressed that if they knew she was pregnant, he would wanted her to have an  because financially, he does not feel they can care for another child.   JULISSA interjected and said that she would have never gotten an  and maybe that's why she never did anything to find out if she was pregnant. JULISSA stated that she was \"extremely stressed\" when they were living with FOB's mother because FOB's mother was \"just plan nasty to me, she has dementia and would say and do awful things to me. \"  MOB justified that her level of stress caused her to have symptoms that mirrored pregnancy. In addition to family stress, JULISSA discussed her illness in July that resulted in a \"burst abscess\" in her throat that was treated by antibiotics. CM inquired about JULISSA's drug history and if she used any substances throughout the time she was pregnant. JULISSA stated, \"You know, I had an occasional drink, maybe half of a [employer] Hard Lemonade, but that's it. I did, we did, take Sudafed everyday because of the mold where we were living. \"  CM inquired specifically about cocaine (due to history), Adderall (noted to ED notes that patient admitted to taking and history of taking), and any other substances used; MOB adamantly denied. CM inquired about JULISSA's drug treatment history, JULISSA stated that she's never had treatment, she just stopped using. JULISSA discussed seeing 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 JULISSA expressed that she \"loved\" meeting with Mr. Sybil Fonseca and his services were very helpful; it should be noted that Mr. Mann specializes in substance abuse treatment. CM encouraged MOB to reestablish services with Mr. Sybil Fonseca or establish care at [address] [place] [address] to begin services to process this change and deal effectively with with depressive symptoms and stress. Information for [facility] and [employer] was provided to patient for transportation needs. Dr. Willie Cooley was MOB's OB with her previous pregnancy, CM recommended a 2 week follow up with Dr. Suzi Schwab office. MOB's dad or sister will provide transportation to MOB, FOB, and  home at discharge. CM will continue to follow.   At this time, UDS and meconium results not available. CM will monitor these results to make necessary referrals depending on results.   Care Management Interventions  Transition of Care Consult (CM Consult): Discharge Planning, Other (No PNC, history of substance use)  Current Support Network: Relative's Home (Lives with  with patient's mother and father)  Confirm Follow Up Transport: Family  Plan discussed with Pt/Family/Caregiver: Yes  Discharge Location  Discharge Placement: [address] [place] [address]

## 2017-01-03 NOTE — PROGRESS NOTES
1/3/2017  0693  SBAR report recd from Tony Barone RN, assumed care of pt at this time. 0 am  Pt off the floor with  via wheelchair. 1555 pm  Dr Mignon Ramirez called per pt request to go home today, Dr Migonn Ramirez stated she will round on pt and discuss with her.  1625 pm  Dr Mignon Ramirez at bedside, discussing with pt that she will be discharged home tomorrow, pt was ok with that.    1915 pm  SBAR report given to [facility]

## 2017-01-03 NOTE — PROGRESS NOTES
Bedside and Verbal shift change report given to Erika Lucas (oncoming nurse) by Kalyan Garcia RN (offgoing nurse). Report given with SBAR, Kardex, Intake/Output, MAR and Recent Results.

## 2017-01-03 NOTE — PROGRESS NOTES
PPD 1    The patient is without complaints. Minimal lochia, voiding well.     Patient Vitals for the past 24 hrs:   Temp Pulse Resp BP SpO2   17 0824 97.8 °F (36.6 °C) 75 16 128/84 -   17 0530 98.4 °F (36.9 °C) 70 14 138/87 -   17 1930 97.9 °F (36.6 °C) 84 14 134/82 -   17 1555 - 86 - 117/74 -   17 1133 97.9 °F (36.6 °C) - - - -   17 1100 - - - 122/89 -   17 1057 - - - 119/73 99 %   17 1055 97.7 °F (36.5 °C) 73 19 138/86 100 %     Abdomen: firm, non tender uterus below umbilcus  Ext: no calf tenderness, no edema    Ass/Plan: PPD 1 s/p , no prenatal care, substance abuse, uds positive for amphetamine  -routine post partum care  -social work consult  -received TDAP 14 months ago, desires flu vaccine  -baby boy, desires circumcision

## 2017-01-03 NOTE — PROGRESS NOTES
8017- Bedside and Verbal shift change report given to 61042Noah Herrera (oncoming nurse) by Jimmy Xie RN (offgoing nurse). Report included the following information SBAR, Kardex, MAR and Recent Results.

## 2017-01-04 VITALS
HEART RATE: 69 BPM | SYSTOLIC BLOOD PRESSURE: 145 MMHG | BODY MASS INDEX: 19.44 KG/M2 | OXYGEN SATURATION: 99 % | TEMPERATURE: 98 F | HEIGHT: 66 IN | DIASTOLIC BLOOD PRESSURE: 94 MMHG | WEIGHT: 121 LBS | RESPIRATION RATE: 18 BRPM

## 2017-01-04 LAB
C TRACH DNA SPEC QL NAA+PROBE: NEGATIVE
HBV SURFACE AG SER QL: <0.1 INDEX
HBV SURFACE AG SER QL: NEGATIVE
HCV AB SERPL QL IA: NONREACTIVE
HCV COMMENT,HCGAC: NORMAL
N GONORRHOEA DNA SPEC QL NAA+PROBE: NEGATIVE
RUBV IGG SER-IMP: REACTIVE
RUBV IGG SERPL IA-ACNC: 21.9 IU/ML
SAMPLE TYPE: NORMAL
SERVICE CMNT-IMP: NORMAL
SPECIMEN SOURCE: NORMAL
SPECIMEN SOURCE: NORMAL
VZV DNA SPEC QL NAA+PROBE: NEGATIVE

## 2017-01-04 PROCEDURE — 90686 IIV4 VACC NO PRSV 0.5 ML IM: CPT | Performed by: OBSTETRICS & GYNECOLOGY

## 2017-01-04 PROCEDURE — 74011250636 HC RX REV CODE- 250/636: Performed by: OBSTETRICS & GYNECOLOGY

## 2017-01-04 PROCEDURE — 90471 IMMUNIZATION ADMIN: CPT

## 2017-01-04 PROCEDURE — 74011250637 HC RX REV CODE- 250/637: Performed by: OBSTETRICS & GYNECOLOGY

## 2017-01-04 RX ORDER — LANOLIN ALCOHOL/MO/W.PET/CERES
325 CREAM (GRAM) TOPICAL
Qty: 60 TAB | Refills: 2 | Status: ON HOLD | OUTPATIENT
Start: 2017-01-04 | End: 2018-05-14

## 2017-01-04 RX ORDER — IBUPROFEN 800 MG/1
800 TABLET ORAL EVERY 8 HOURS
Qty: 20 TAB | Refills: 0 | Status: ON HOLD | OUTPATIENT
Start: 2017-01-04 | End: 2018-05-14

## 2017-01-04 RX ORDER — SWAB
1 SWAB, NON-MEDICATED MISCELLANEOUS DAILY
Qty: 60 TAB | Refills: 1 | Status: ON HOLD | OUTPATIENT
Start: 2017-01-04 | End: 2018-05-14

## 2017-01-04 RX ORDER — DOCUSATE SODIUM 100 MG/1
100 CAPSULE, LIQUID FILLED ORAL
Qty: 30 CAP | Refills: 0 | Status: SHIPPED | OUTPATIENT
Start: 2017-01-04 | End: 2017-04-04

## 2017-01-04 RX ADMIN — Medication 1 TABLET: at 09:58

## 2017-01-04 RX ADMIN — IBUPROFEN 800 MG: 800 TABLET, FILM COATED ORAL at 09:58

## 2017-01-04 RX ADMIN — INFLUENZA VIRUS VACCINE 0.5 ML: 15; 15; 15; 15 SUSPENSION INTRAMUSCULAR at 10:59

## 2017-01-04 RX ADMIN — IRON 325 MG: 65 TABLET ORAL at 09:58

## 2017-01-04 NOTE — PROGRESS NOTES
01/04/17 11:37 AM  Received call from Almyra Lombard ([phone]), CPS Worker. Expressed concerns regarding total report and past history with CPS. Almyra Lombard requesting patient to not be discharged immediately, she will staff case with her supervisor to see if hospital visit needed or follow up at home can be done. Nursing updated, confirmed that MOB will not be leaving yet as baby still needs to have circ. 01/04/17 10:43 AM  Met briefly with parents while they were eating breakfast.  [place] [address] [place] [facility] Baby's UDS positive for amphetamines; MOB's UDS also positive for amphetamines. Baby's meconium results are pending. CPS contacted ([facility] CPS Hotline [phone]) due to positive drug screens. Referral screened in, referral number is [id number]. Requested call back from assigned [address] [place] [address] worker.   BOB Sampson

## 2017-01-04 NOTE — DISCHARGE INSTRUCTIONS
Patient Discharge Instructions    Adina Hammond / [id number] : 1980    Admitted 2017 Discharged: 2017       Please bring this form with you to show your care provider at your follow-up appointment. Primary care provider:  None    Discharging provider:  Nicolasa Rahman MD  - Family Medicine Resident  [facility] Hospitalist Attending      ACUTE DIAGNOSES:  Maternity      FOLLOW-UP CARE RECOMMENDATIONS:    Follow-up Information     Follow up With Details Comments Contact Valeriy Castaneda MD Schedule an appointment as soon as possible for a visit in 2 weeks early post-partum visit due to complicated hx. Need to follow up labs and BP.  [address] [place] [address] Family Medicine Residency Schedule an appointment as soon as possible for a visit in 6 weeks  [address]  [phone]          Continuing Therapy:  - Please continue Motrin 800 mg, 1 tablet every 8 hours for the next 2 days, then 1 tablet every 8 hours as needed for pain  - Please continue Colace 100 mg for constipation, take one tablet BID until having regular bowel movements  - Please start Ferrous Sulfate 325 mg for anemia, Take 1 tablet 2 times daily  - Please continue your prenatal vitamins     Follow-up tests needed: BP check necessary as pt had elevated BP. CBC for anemia. Further tests to be determined by PCP/OB     Pending test results: At the time of your discharge the following test results are still pending: varicella, GC/chlamydia, HepBsAg, HepCAb  Please make sure you review these results with your outpatient follow-up provider(s). Specific symptoms to watch for: chest pain, shortness of breath, fever, chills, nausea, vomiting, diarrhea, change in mentation, falling, weakness, bleeding.      DIET/what to eat:  Regular Diet    ACTIVITY:   Activity Instructions    Do not lift anything heavier than your baby for 6 weeks. Nothing in the vagina for 6 weeks. You are ok to drive as long as you are not taking Percocet or other narcotic pain medication (Motrin is ok). Wound care: None    I understand that if any problems occur once I am at home I am to contact my physician. These instructions were explained to me and I had the opportunity to ask questions. I understand and acknowledge receipt of the instructions indicated above. Physician's or R.N.'s Signature                                                                  Date/Time                                                                                                                                              Patient or Representative Signature                                                          Date/Time         Vaginal Childbirth: Care Instructions  Your Care Instructions  Your body will slowly heal in the next few weeks. It is easy to get too tired and overwhelmed during the first weeks after your baby is born. Changes in your hormones can shift your mood without warning. You may find it hard to meet the extra demands on your energy and time. Take it easy on yourself. Follow-up care is a key part of your treatment and safety. Be sure to make and go to all appointments, and call your doctor if you are having problems. It's also a good idea to know your test results and keep a list of the medicines you take. How can you care for yourself at home? · Vaginal bleeding and cramps  ¨ After delivery, you will have a bloody discharge from the vagina. This will turn pink within a week and then white or yellow after about 10 days. It may last for 2 to 4 weeks or longer, until the uterus has healed. Use pads instead of tampons until you stop bleeding.   ¨ Do not worry if you pass some blood clots, as long as they are smaller than a golf ball. If you have a tear or stitches in your vaginal area, change the pad at least every 4 hours to prevent soreness and infection. ¨ You may have cramps for the first few days after childbirth. These are normal and occur as the uterus shrinks to normal size. Take an over-the-counter pain medicine, such as acetaminophen (Tylenol), ibuprofen (Advil, Motrin), or naproxen (Aleve), for cramps. Read and follow all instructions on the label. Do not take aspirin, because it can cause more bleeding. ¨ Do not take two or more pain medicines at the same time unless the doctor told you to. Many pain medicines have acetaminophen, which is Tylenol. Too much acetaminophen (Tylenol) can be harmful. · Stitches  ¨ If you have stitches, they will dissolve on their own and do not need to be removed. Follow your doctor's instructions for cleaning the stitched area. ¨ Put ice or a cold pack on your painful area for 10 to 20 minutes at a time, several times a day, for the first few days. Put a thin cloth between the ice and your skin. ¨ Sit in a few inches of warm water (sitz bath) 3 times a day and after bowel movements. The warm water helps with pain and itching. If you do not have a tub, a warm shower might help. · Breast fullness  ¨ Your breasts may overfill (engorge) in the first few days after delivery. To help milk flow and to relieve pain, warm your breasts in the shower or by using warm, moist towels before nursing. ¨ If you are not nursing, do not put warmth on your breasts or touch your breasts. Wear a tight bra or sports bra and use ice until the fullness goes away. This usually takes 2 to 3 days. ¨ Put ice or a cold pack on your breast after nursing to reduce swelling and pain. Put a thin cloth between the ice and your skin. · Activity  ¨ Eat a balanced diet. Do not try to lose weight by cutting calories. Keep taking your prenatal vitamins, or take a multivitamin.   ¨ Get as much rest as you can. Try to take naps when your baby sleeps during the day. ¨ Get some exercise every day. But do not do any heavy exercise until your doctor says it is okay. ¨ Wait until you are healed (about 4 to 6 weeks) before you have sexual intercourse. Your doctor will tell you when it is okay to have sex. ¨ Talk to your doctor about birth control. You can get pregnant even before your period returns. Also, you can get pregnant while you are breastfeeding. · Mental health  ¨ It is normal to have some sadness, anxiety, sleeplessness, and mood swings after you go home. If you feel upset or hopeless for more than a few days or are having trouble doing the things you need to do, talk to your doctor. · Constipation and hemorrhoids  ¨ Drink plenty of fluids, enough so that your urine is light yellow or clear like water. If you have kidney, heart, or liver disease and have to limit fluids, talk with your doctor before you increase the amount of fluids you drink. ¨ Eat plenty of fiber each day. Have a bran muffin or bran cereal for breakfast, and try eating a piece of fruit for a mid-afternoon snack. ¨ For painful, itchy hemorrhoids, put ice or a cold pack on the area several times a day for 10 minutes at a time. Follow this by putting a warm compress on the area for another 10 to 20 minutes or by sitting in a shallow, warm bath. When should you call for help? Call 911 anytime you think you may need emergency care. For example, call if:  · You are thinking of hurting yourself, your baby, or anyone else. · You have sudden, severe pain in your belly. · You passed out (lost consciousness). Call your doctor now or seek immediate medical care if:  · You have severe vaginal bleeding. · You are soaking through a pad each hour for 2 or more hours. · Your vaginal bleeding seems to be getting heavier or is still bright red 4 days after delivery. · You are dizzy or lightheaded, or you feel like you may faint.   · You are vomiting or cannot keep fluids down. · You have a fever. · You have new or more belly pain. · You pass tissue (not just blood). · Your vaginal discharge smells bad. · Your belly feels tender or full and hard. · Your breasts are continuously painful or red. · You feel sad, anxious, or hopeless for more than a few days. Watch closely for changes in your health, and be sure to contact your doctor if you have any problems. Where can you learn more? Go to http://jada-mo.info/. Enter K133 in the search box to learn more about \"Vaginal Childbirth: Care Instructions. \"  Current as of: May 30, 2016  Content Version: 11.1  © 5361-5521 [employer]. Care instructions adapted under license by [employer] (which disclaims liability or warranty for this information). If you have questions about a medical condition or this instruction, always ask your healthcare professional. [place] [address] any warranty or liability for your use of this information. Secondhand Smoke in Children: Care Instructions  Your Care Instructions  Secondhand smoke comes from the burning end of a cigarette, cigar, or pipe and the smoke that a smoker exhales. The smoke contains nicotine and many other harmful chemicals. Breathing secondhand smoke can cause or worsen health problems including cancer, asthma, coronary artery disease, and respiratory infections. It can make your child's eyes and nose burn and cause a sore throat. Secondhand smoke is especially bad for babies and young children whose lungs are still developing. Babies whose parents smoke are more likely to have ear infections, pneumonia, and bronchitis in the first few years of their lives. Secondhand smoke can make asthma symptoms worse in children. Follow-up care is a key part of your child's treatment and safety.  Be sure to make and go to all appointments, and call your doctor if your child is having problems. It's also a good idea to know your child's test results and keep a list of the medicines your child takes. How can you care for your child at home? · Do not smoke or let anyone else smoke in your home. If people must smoke, ask them to go outside. · If people do smoke in your home, choose a room where you can open a window or use a fan to get the smoke outside. · Do not let anyone smoke in your car. If someone must smoke, pull over in a safe place and let the person smoke away from the car. · Make sure that your children are not exposed to secondhand smoke at day care, school, and after-school programs. · Try to choose nonsmoking restaurants and other public places when you go out with your children. · Help your family and friends who smoke to quit by encouraging them to try. Tell them about treatment resources. Having support from others often helps. · If you smoke, quit. Quitting is hard, but there are ways to boost your chance of quitting tobacco for good. ¨ Use nicotine gum, patches, or lozenges. Call a quitline. Ask your doctor about stop-smoking programs and medicines. ¨ Keep trying. When should you call for help? Watch closely for changes in your child's health, and be sure to contact your doctor if your child has any problems. Where can you learn more? Go to http://jada-mo.info/. Enter C398 in the search box to learn more about \"Secondhand Smoke in Children: Care Instructions. \"  Current as of: May 26, 2016  Content Version: 11.1  © 4766-8372 [employer]. Care instructions adapted under license by [employer] (which disclaims liability or warranty for this information). If you have questions about a medical condition or this instruction, always ask your healthcare professional. [address] any warranty or liability for your use of this information.        Stopping Smoking: Care Instructions  Your Care Instructions  Cigarette smokers crave the nicotine in cigarettes. Giving it up is much harder than simply changing a habit. Your body has to stop craving the nicotine. It is hard to quit, but you can do it. There are many tools that people use to quit smoking. You may find that combining tools works best for you. There are several steps to quitting. First you get ready to quit. Then you get support to help you. After that, you learn new skills and behaviors to become a nonsmoker. For many people, a necessary step is getting and using medicine. Your doctor will help you set up the plan that best meets your needs. You may want to attend a smoking cessation program to help you quit smoking. When you choose a program, look for one that has proven success. Ask your doctor for ideas. You will greatly increase your chances of success if you take medicine as well as get counseling or join a cessation program.  Some of the changes you feel when you first quit tobacco are uncomfortable. Your body will miss the nicotine at first, and you may feel short-tempered and grumpy. You may have trouble sleeping or concentrating. Medicine can help you deal with these symptoms. You may struggle with changing your smoking habits and rituals. The last step is the tricky one: Be prepared for the smoking urge to continue for a time. This is a lot to deal with, but keep at it. You will feel better. Follow-up care is a key part of your treatment and safety. Be sure to make and go to all appointments, and call your doctor if you are having problems. Its also a good idea to know your test results and keep a list of the medicines you take. How can you care for yourself at home? · Ask your family, friends, and coworkers for support. You have a better chance of quitting if you have help and support. · Join a support group, such as Nicotine Anonymous, for people who are trying to quit smoking.   · Consider signing up for a smoking cessation program, such as the American Lung Association's Freedom from Smoking program.  · Set a quit date. Pick your date carefully so that it is not right in the middle of a big deadline or stressful time. Once you quit, do not even take a puff. Get rid of all ashtrays and lighters after your last cigarette. Clean your house and your clothes so that they do not smell of smoke. · Learn how to be a nonsmoker. Think about ways you can avoid those things that make you reach for a cigarette. ¨ Avoid situations that put you at greatest risk for smoking. For some people, it is hard to have a drink with friends without smoking. For others, they might skip a coffee break with coworkers who smoke. ¨ Change your daily routine. Take a different route to work or eat a meal in a different place. · Cut down on stress. Calm yourself or release tension by doing an activity you enjoy, such as reading a book, taking a hot bath, or gardening. · Talk to your doctor or pharmacist about nicotine replacement therapy, which replaces the nicotine in your body. You still get nicotine but you do not use tobacco. Nicotine replacement products help you slowly reduce the amount of nicotine you need. These products come in several forms, many of them available over-the-counter:  ¨ Nicotine patches  ¨ Nicotine gum and lozenges  ¨ Nicotine inhaler  · Ask your doctor about bupropion (Wellbutrin) or varenicline (Chantix), which are prescription medicines. They do not contain nicotine. They help you by reducing withdrawal symptoms, such as stress and anxiety. · Some people find hypnosis, acupuncture, and massage helpful for ending the smoking habit. · Eat a healthy diet and get regular exercise. Having healthy habits will help your body move past its craving for nicotine. · Be prepared to keep trying. Most people are not successful the first few times they try to quit. Do not get mad at yourself if you smoke again.  Make a list of things you learned and think about when you want to try again, such as next week, next month, or next year. Where can you learn more? Go to http://jada-mo.info/. Enter V928 in the search box to learn more about \"Stopping Smoking: Care Instructions. \"  Current as of: May 26, 2016  Content Version: 11.1  © 1756-0226 [employer]. Care instructions adapted under license by [employer] (which disclaims liability or warranty for this information). If you have questions about a medical condition or this instruction, always ask your healthcare professional. [place] [address] any warranty or liability for your use of this information.

## 2017-01-04 NOTE — PROGRESS NOTES
0720: OB SBAR report received from CELESTINO Celaya RN care taken over at this time. 3068: Dr. Trivedi made aware of elevated BPs, no new orders received from MD [facility] stated to call [facility] Resident on call for pts discharge. 26: Family practice resident given update on pt, MD stated she will be in to see pt shortly. 1110: Pt given written and verbal discharge instructions as well as prescriptions. Pt verbalized understanding.

## 2017-01-04 NOTE — DISCHARGE SUMMARY
Obstetrical Discharge Summary     Name: Preston Jiménez MRN: [id number]  SSN: [id number]    YOB: 1980  Age: 29 y.o. Sex: female      Admit Date: 2017    Discharge Date: 2017     Admitting Physician: Pennie Ca MD     Attending Physician:  Pennie Ca MD     Admission Diagnoses: Maternity    Discharge Diagnoses:   Information for the patient's :  Scherry Oppenheim, Male [[id number]]   Delivery of a 5 lb 0.4 oz (2.28 kg) male infant via [address], Spontaneous on 2017 at 10:50 AM  by . Apgars were 8 and 9. Additional Diagnoses:   Hospital Problems  Date Reviewed: 2015    None         Lab Results   Component Value Date/Time    Rubella, External immune 2015    GrBStrep, External negative 2015       Immunization(s):   Immunization History   Administered Date(s) Administered    Tdap 10/13/2015        Hospital Course:   Patient is a 29 y.o.  s/p  of a VMI at unknown gestational age currently PPD #2. Pt did not know she was pregnant and had a precipitous delivery in the ED after presenting with abdominal cramping, vaginal bleeding and abdominal distention. Pt has had no prenatal care prior to presentation since she was unaware of this pregnancy and she has a hx of cocaine and methamphetamine use, and reports alcohol and cigarette use during pregnancy. Post-partum labwork revealed: A+, Hg of 11.3, down to 9.2 on day of discharge. UDS was positive for amphetamines. HIV and RPR non-reactive. Rest of the labs are still pending. Pt has been seen by case management: CM encouraged pt to reestablish services with Mr. Baylee Wells (specializes in substance abuse treatment) or establish care at [address] [place] [address] to begin services to process this change and deal effectively with with depressive symptoms and post-partum stress. On day of discharge patient reported minimal lochia, well controlled pain on motrin, and no other complaints.  Episodes of elevated BP noted: 132/91, 145/94-likely 2/2 pt smoking. Encouraged smoking cessation and provided alternative options such as patch and gum-pt stated that she is not ready to quit yet. Will need outpatient follow-up of anemia and blood pressure to determine if she needs any antihypertensives. Discharged with pain regimen and bowel regimen. Advised to continue prenatal vitamins.  Received flu vaccine prior to discharge. Recommended pt to follow-up with OB in 2 weeks, she stated that she will return to see her previous OB: Dr. Audrey Fitzgerald. Also encouraged her to establish a primary care physician at our clinic or any other that she prefers. Condition at Discharge:  Stable     Disposition: Discharge to Home    Physical exam:  Visit Vitals    BP (!) 145/94    Pulse 69    Temp 98 °F (36.7 °C)    Resp 18    Ht 5' 6\" (1.676 m)    Wt 121 lb (54.9 kg)    LMP 06/28/2016 (Approximate)    SpO2 99%    BMI 19.53 kg/m2       Exam:  Patient without distress. CTAB, no w/r/r/c               RRR, + S1 and S2, no m/r/g               Abdomen soft, fundus firm at level of umbilicus, non tender               Perineum with normal lochia noted. Lower extremities are negative for swelling, cords or tenderness. Patient Instructions:   Current Discharge Medication List      CONTINUE these medications which have NOT CHANGED    Details   ibuprofen (MOTRIN) 200 mg tablet Take 800 mg by mouth. Indications: PAIN      azithromycin (ZITHROMAX) 250 mg tablet       oxyCODONE-acetaminophen (PERCOCET) 5-325 mg per tablet Take 1-2 Tabs by mouth every four (4) hours as needed for Pain. Max Daily Amount: 12 Tabs. Qty: 20 Tab, Refills: 0      PNV no.24-iron-folic acid-dha (PRENATAL DHA+COMPLETE PRENATAL) -300 mg-mcg-mg cmpk Take  by mouth.      calcium carbonate (TUMS) 200 mg calcium (500 mg) chew Take 1 Tab by mouth daily. acetaminophen (TYLENOL) 325 mg tablet Take 650 mg by mouth every four (4) hours as needed for Pain. Reference my discharge instructions. Follow-up Information     Follow up With Details Comments Contact Info    Violetta Pham MD Schedule an appointment as soon as possible for a visit in 2 weeks early post-partum visit due to complicated hx.  Need to follow up labs and BP.  [address] [place] [address] [address]  [phone]      [address] [place] [address] Medicine Residency Schedule an appointment as soon as possible for a visit in 7 weeks  [address]ails   [phone]            Signed By:  Osiel Fulton MD    Family Medicine Resident

## 2017-01-04 NOTE — PROGRESS NOTES
SBAR report from [employer]. Care of patient assumed at this time.  Patient talking on phone post shower in room. Assessment deferred at this time, RN will re attempt in 30 minutes.  Patient refused fundal check at this time. States she is no longer bleeding. Patient states she may agree to a fundal check later when a family member is present but does not want to be touched at this time. RN explained purpose of fundal check. Patient verbalized understanding and states she will alert nurse if she changes her mind. 11:49 PM Patient sitting in bed, rocking back and forth. Nurse inquired if patient was okay, patient states \"I'm just justice right now. My  and I got in a fight and I want to wake him up and apologize but I'm just crazy right now. \" RN asked patient to elaborate on what she is feeling and experiencing, patient states she has had PPD in the past and doesn't want to have it again and that she is having a hard time coping with having a  again and not feeling prepared. Patient also states she is worried about her son and the fact that he is not eating like he should, states she has never experienced that problem with her past children. Patient states she is looking forward to going home and \"getting her life together\". Nurse encouraged patient to ask for help and talk bout what feelings she experiences. RN also encouraged patient to ask questions and speak with nursery regarding son. Patient verbalized understanding. Patient states she has not slept in days and she is very tired but also very wired and \"being crazy\". RN encouraged sleep and adequate rest. Patient verbalized understanding    3:24 AM Patient still refusing fundal check at this time. States her bleeding is still minimal and controlled. Experiencing no pain. Just wishes to \"read until I fall asleep\"    7:20 AM SBAR bedside report to Jn Sotelo RN.  Care of patient released at this time

## 2018-05-09 ENCOUNTER — HOSPITAL ENCOUNTER (INPATIENT)
Age: 38
LOS: 9 days | Discharge: HOME | DRG: 885 | End: 2018-05-18
Attending: EMERGENCY MEDICINE
Payer: COMMERCIAL

## 2018-05-09 DIAGNOSIS — F23 ACUTE PSYCHOSIS (HCC): Primary | ICD-10-CM

## 2018-05-09 PROBLEM — F29 PSYCHOTIC DISORDER (HCC): Status: ACTIVE | Noted: 2018-05-09

## 2018-05-09 LAB — TSH SERPL DL<=0.05 MIU/L-ACNC: 0.65 UIU/ML (ref 0.36–3.74)

## 2018-05-09 PROCEDURE — 36415 COLL VENOUS BLD VENIPUNCTURE: CPT

## 2018-05-09 PROCEDURE — 80307 DRUG TEST PRSMV CHEM ANLYZR: CPT | Performed by: EMERGENCY MEDICINE

## 2018-05-09 PROCEDURE — 85025 COMPLETE CBC W/AUTO DIFF WBC: CPT | Performed by: EMERGENCY MEDICINE

## 2018-05-09 PROCEDURE — 87077 CULTURE AEROBIC IDENTIFY: CPT | Performed by: EMERGENCY MEDICINE

## 2018-05-09 PROCEDURE — 65220000003 HC RM SEMIPRIVATE PSYCH

## 2018-05-09 PROCEDURE — 74011250636 HC RX REV CODE- 250/636

## 2018-05-09 PROCEDURE — 87086 URINE CULTURE/COLONY COUNT: CPT | Performed by: EMERGENCY MEDICINE

## 2018-05-09 PROCEDURE — 99285 EMERGENCY DEPT VISIT HI MDM: CPT

## 2018-05-09 PROCEDURE — 81025 URINE PREGNANCY TEST: CPT

## 2018-05-09 PROCEDURE — 74011000250 HC RX REV CODE- 250

## 2018-05-09 PROCEDURE — 80053 COMPREHEN METABOLIC PANEL: CPT | Performed by: EMERGENCY MEDICINE

## 2018-05-09 PROCEDURE — 81001 URINALYSIS AUTO W/SCOPE: CPT | Performed by: EMERGENCY MEDICINE

## 2018-05-09 PROCEDURE — 84443 ASSAY THYROID STIM HORMONE: CPT

## 2018-05-09 PROCEDURE — 87186 SC STD MICRODIL/AGAR DIL: CPT | Performed by: EMERGENCY MEDICINE

## 2018-05-09 RX ORDER — IBUPROFEN 400 MG/1
400 TABLET ORAL
Status: DISCONTINUED | OUTPATIENT
Start: 2018-05-09 | End: 2018-05-18 | Stop reason: HOSPADM

## 2018-05-09 RX ORDER — OLANZAPINE 2.5 MG/1
2.5 TABLET ORAL 2 TIMES DAILY
Status: DISCONTINUED | OUTPATIENT
Start: 2018-05-09 | End: 2018-05-11

## 2018-05-09 RX ORDER — LORAZEPAM 1 MG/1
1 TABLET ORAL
Status: DISCONTINUED | OUTPATIENT
Start: 2018-05-09 | End: 2018-05-18 | Stop reason: HOSPADM

## 2018-05-09 RX ORDER — BENZTROPINE MESYLATE 1 MG/ML
2 INJECTION INTRAMUSCULAR; INTRAVENOUS
Status: DISCONTINUED | OUTPATIENT
Start: 2018-05-09 | End: 2018-05-18 | Stop reason: HOSPADM

## 2018-05-09 RX ORDER — ZOLPIDEM TARTRATE 5 MG/1
5 TABLET ORAL
Status: DISCONTINUED | OUTPATIENT
Start: 2018-05-09 | End: 2018-05-18 | Stop reason: HOSPADM

## 2018-05-09 RX ORDER — BENZTROPINE MESYLATE 2 MG/1
2 TABLET ORAL
Status: DISCONTINUED | OUTPATIENT
Start: 2018-05-09 | End: 2018-05-18 | Stop reason: HOSPADM

## 2018-05-09 RX ORDER — OLANZAPINE 5 MG/1
5 TABLET ORAL
Status: DISCONTINUED | OUTPATIENT
Start: 2018-05-09 | End: 2018-05-18 | Stop reason: HOSPADM

## 2018-05-09 RX ORDER — ADHESIVE BANDAGE
30 BANDAGE TOPICAL DAILY PRN
Status: DISCONTINUED | OUTPATIENT
Start: 2018-05-09 | End: 2018-05-18 | Stop reason: HOSPADM

## 2018-05-09 RX ORDER — ACETAMINOPHEN 325 MG/1
650 TABLET ORAL
Status: DISCONTINUED | OUTPATIENT
Start: 2018-05-09 | End: 2018-05-18 | Stop reason: HOSPADM

## 2018-05-09 RX ORDER — ZOLPIDEM TARTRATE 10 MG/1
10 TABLET ORAL
Status: DISCONTINUED | OUTPATIENT
Start: 2018-05-09 | End: 2018-05-09 | Stop reason: CLARIF

## 2018-05-09 RX ORDER — LORAZEPAM 2 MG/ML
2 INJECTION INTRAMUSCULAR
Status: DISCONTINUED | OUTPATIENT
Start: 2018-05-09 | End: 2018-05-18 | Stop reason: HOSPADM

## 2018-05-09 RX ORDER — IBUPROFEN 200 MG
1 TABLET ORAL
Status: DISCONTINUED | OUTPATIENT
Start: 2018-05-09 | End: 2018-05-18 | Stop reason: HOSPADM

## 2018-05-09 RX ADMIN — LORAZEPAM 2 MG: 2 INJECTION INTRAMUSCULAR; INTRAVENOUS at 05:30

## 2018-05-09 RX ADMIN — LORAZEPAM 2 MG: 2 INJECTION INTRAMUSCULAR; INTRAVENOUS at 16:01

## 2018-05-09 RX ADMIN — WATER 20 MG: 1 INJECTION INTRAMUSCULAR; INTRAVENOUS; SUBCUTANEOUS at 16:01
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
## 2018-05-09 NOTE — INTERDISCIPLINARY ROUNDS
Behavioral Health Interdisciplinary Rounds     Patient Name: Dhaval Fitzgerald  Age: 41 y.o. Room/Bed:  732/02  Primary Diagnosis: Psychotic disorder   Admission Status: TDO     Readmission within 30 days: no  Power of  in place: no  Patient requires a blocked bed: yes          Reason for blocked bed: Behavior    VTE Prophylaxis: Not indicated    Mobility needs/Fall risk: no    Nutritional Plan: no  Consults:          Labs/Testing due today?: no    Sleep hours:  0      Participation in Care/Groups: New admit  Medication Compliant?: no scheduled meds  PRNS (last 24 hours): Antianxiety    Restraints (last 24 hours):  yes     CIWA (range last 24 hours):     COWS (range last 24 hours):      Alcohol screening (AUDIT) completed -         If applicable, date SBIRT discussed in treatment team AND documented:     Tobacco - patient is a smoker:    Illegal Drugs use:      24 hour chart check complete: yes     Patient goal(s) for today:   Treatment team focus/goals: Plan to assess for medications and discharge plans. Plan to set up her hearing   Progress note - She was paranoid and psychotic in treatment team.  Refuse to answer most questions. Possibly homeless.       LOS:  0  Expected LOS: TBD     Financial concerns/prescription coverage:  Medicaid   Date of last family contact:       Family requesting physician contact today:    Discharge plan: may be homeless   Guns in the home:   no      Outpatient provider(s):TBD     Participating treatment team members: Yamilet Wren SW - Dr. Charl Josephs - Rondall Huh
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
## 2018-05-09 NOTE — BH NOTES
Primary Nurse Ladonna Skiff, RN and Severo Berry RN performed a dual skin assessment on this patient Impairment noted- see wound doc flow sheet  Yahir score is 23    Pt has multiple scratches bilateral feet/legs. 1 large tattoo on back. Several scratches bilateral arms. Bruise upper inner left thigh  Abrasion on right inner thigh  Mid thoracic spine pea size abrasion red no drainage noted.  Encouraged pt to turn

## 2018-05-09 NOTE — BH NOTES
BEHAVIORAL HEALTH RESTRAINT/SECLUSION PROGRESS NOTE TERMINATION OF RESTRAINT/SECLUSION    1. Current mental status/behavior: Calm, Cooperative, Denies suicidal ideation and Denies homicidal ideation    2. Criteria for release met: No longer verbalizing threats of harm to self and others    3.  Additional interventions to prevent recurrence of dangerous behaviors include the following in addition to the debriefing process by the team.         RN Signature__________________________________ Date_______________      MD Signature__________________________________ Date_______________

## 2018-05-09 NOTE — IP AVS SNAPSHOT
Summary of Care Report The Summary of Care report has been created to help improve care coordination. Users with access to [employer] or [address] (Web-based application) may access additional patient information including the Discharge Summary. If you are not currently a [address] user and need more information, please call the number listed below in the [place] [address] section and ask to be connected with Medical Records. Facility Information Name Address Phone [address] [id number] [phone] Patient Information Patient Name Sex NITZA Jha Hidden ([id number]) Female 1980 Discharge Information Admitting Provider Service Area Unit Altaf Mchugh MD / [phone] [address] [facility] 7 Acute Amber Austin / [phone] Discharge Provider Discharge Date/Time Discharge Disposition Destination (none) 2018 Afternoon (Pending) AHR (none) Patient Language Language ENGLISH [13] Hospital Problems as of 2018  Reviewed: 2015 12:08 PM by Karthik Gee MD  
  
  
  
 Class Noted - Resolved Last Modified POA Active Problems * (Principal)Psychotic disorder  2018 - Present 2018 by Kal Tamayo MD Yes Entered by Altaf Mchugh MD  
  
Non-Hospital Problems as of 2018  Reviewed: 2015 12:08 PM by Karthik Gee MD  
 None You are allergic to the following Allergen Reactions Latex Rash Codeine Rash Current Discharge Medication List  
  
START taking these medications Dose & Instructions Dispensing Information Comments  
 haloperidol 10 mg tablet Commonly known as:  HALDOL Notes to Patient:  Scheduled twice a day:   8 am  &   8 pm  
 Dose:  10 mg Take 1 Tab by mouth two (2) times a day. Indications: Psychotic Disorder Quantity:  30 Tab Refills:  1 Current Immunizations Name Date Influenza Vaccine (Quad) PF 2017 Tdap 10/13/2015 Follow-up Information Follow up With Details Comments Contact Info [address] [facility] on 2018 plan to go to [place] to complete assessment and open your case  [facility] [address] [address] 
[phone] None   None (395) Patient stated that they have no PCP Discharge Instructions DISCHARGE SUMMARY 
 
NAME:Doreen Biggs : 1980 MRN: [id number] The patient Doreen Biggs exhibits the ability to control behavior in a less restrictive environment. Patient's level of functioning is improving. No assaultive/destructive behavior has been observed for the past 24 hours. No suicidal/homicidal threat or behavior has been observed for the past 24 hours. There is no evidence of serious medication side effects. Patient has not been in physical or protective restraints for at least the past 24 hours. If weapons involved, how are they secured? No weapons involved Is patient aware of and in agreement with discharge plan? Patient is aware of discharge and is in agreement Arrangements for medication:  Prescriptions filled at [facility] Referral for substance abuse treatment ? Yes, referred to [facility] Referral for smoking cessation needed ? Yes, refused Copy of discharge instructions to  provider?:  Yes , fax to [address] Arrangements for transportation home:  Taxi to [facility] Keep all follow up appointments as scheduled, continue to take prescribed medications per physician instructions. Mental health crisis number:  [phone] or your local mental health crisis line number at [phone] DISCHARGE SUMMARY from Nurse PATIENT INSTRUCTIONS: 
What to do at Home: 
Recommended activity: Activity as tolerated. If you experience any of the following symptoms hopeless helpless overwhelming thoughts of harming of harming self or others, uncontrolled racing thoughts or paranoid thoughts, please call 911 or your local mental health crisis line number at [phone]. *  Please give a list of your current medications to your Primary Care Provider. *  Please update this list whenever your medications are discontinued, doses are 
    changed, or new medications (including over-the-counter products) are added. *  Please carry medication information at all times in case of emergency situations. These are general instructions for a healthy lifestyle: No smoking/ No tobacco products/ Avoid exposure to second hand smoke Surgeon General's Warning:  Quitting smoking now greatly reduces serious risk to your health. Obesity, smoking, and sedentary lifestyle greatly increases your risk for illness A healthy diet, regular physical exercise & weight monitoring are important for maintaining a healthy lifestyle You may be retaining fluid if you have a history of heart failure or if you experience any of the following symptoms:  Weight gain of 3 pounds or more overnight or 5 pounds in a week, increased swelling in our hands or feet or shortness of breath while lying flat in bed. Please call your doctor as soon as you notice any of these symptoms; do not wait until your next office visit. Recognize signs and symptoms of STROKE: 
 
F-face looks uneven A-arms unable to move or move unevenly S-speech slurred or non-existent T-time-call 911 as soon as signs and symptoms begin-DO NOT go Back to bed or wait to see if you get better-TIME IS BRAIN. Warning Signs of HEART ATTACK Call 911 if you have these symptoms: 
? Chest discomfort.  Most heart attacks involve discomfort in the center of the chest that lasts more than a few minutes, or that goes away and comes back. It can feel like uncomfortable pressure, squeezing, fullness, or pain. ? Discomfort in other areas of the upper body. Symptoms can include pain or discomfort in one or both arms, the back, neck, jaw, or stomach. ? Shortness of breath with or without chest discomfort. ? Other signs may include breaking out in a cold sweat, nausea, or lightheadedness. Don't wait more than five minutes to call [address]! Fast action can save your life. Calling 911 is almost always the fastest way to get lifesaving treatment. Emergency Medical Services staff can begin treatment when they arrive  up to an hour sooner than if someone gets to the hospital by car. The discharge information has been reviewed with the patient. The patient verbalized understanding. Discharge medications reviewed with the patient and appropriate educational materials and side effects teaching were provided. ___________________________________________________________________________________________________________________________________ Chart Review Routing History Recipient Method Report Sent By Amanda Ceballos MD  
Phone: [phone] In [facility] CUSTOM LAB REPORT Baldev Reddy [[id number]] 4/22/2015  7:04 AM 04/20/2015

## 2018-05-09 NOTE — IP AVS SNAPSHOT
[address] [facility] [address] [place] [address] 
[phone] Patient: Jeannine Ly MRN: [id number] [facility]:4/38/0102 A check marielle indicates which time of day the medication should be taken. My Medications START taking these medications Instructions Each Dose to Equal  
 Morning Noon Evening Bedtime  
 haloperidol 10 mg tablet Commonly known as:  HALDOL Your last dose was:  5/19/18   8 am  
Your next dose is:  5/19/18    8 pm  
Notes to Patient:  Scheduled twice a day:   8 am  &   8 pm  
   
 Take 1 Tab by mouth two (2) times a day. Indications: Psychotic Disorder 10 mg Where to Get Your Medications These medications were sent to [address] [place], [address], [address] [place] [address] Phone:  [phone]  
  haloperidol 10 mg tablet

## 2018-05-09 NOTE — BH NOTES
Care Management Note:    SW was unable to reach  - Not a working number on prescreening . [phone]

## 2018-05-09 NOTE — BH NOTES
The documentation for this period is being entered following the guidelines as defined in the [facility] policy by Marin López.    [id number]

## 2018-05-09 NOTE — ED NOTES
The documentation for this period is being entered following the guidelines as defined in the [facility] policy by Lesvia Bustamante.

## 2018-05-09 NOTE — BH NOTES
Primary Nurse Betty Love, RN and Alpesh Espinal RN, RN performed a dual skin assessment on this patient No impairment noted  Yahir score is 23 Noted in assessment was tattoo  Upper back and right lower abdomen. Multiple small scratches bilateral shins.

## 2018-05-09 NOTE — BH NOTES
GROUP THERAPY PROGRESS NOTE    Doreen Biggs did not participate in a 45 minute Acute Unit's Process Group, with a focus identifying feelings, planning for the rest of the day, and discussing discharge.

## 2018-05-09 NOTE — BH NOTES
PSYCHOSOCIAL ASSESSMENT    Patient identifying info:  Doreen Yepez is a 41 y.o., female admitted 5/9/2018  4:27 AM     Presenting problem and precipitating factors:She was sent to the ER for medical clearance from [facility] due to bizarre and psychotic behavior . She was found unresponsive under a car.  had called 911 due to her bizarre behavior. She was putting metal cans into the microwave, pouring trash out into the house and was stating \"squatters are trying to get her out of the house , and they are using witchcraft. She and her  have been packing to move , they have been evicted. Mental status assessment:alert, oriented x's - very angry - irritable and psychotic     Current psychiatric/substance abuse providers and contact info:    Previous psychiatric or substance abuse services/providers and response to treatment:she was hospitalized at [facility] 7 weeks ago - she was given a prescription for Risperdal but never took medications    Family history of substance abuse or mental illness:unknown     Substance abuse history:  Drug screen was positive for amphetamines       Social History   Substance Use Topics    Smoking status: Current Every Day Smoker     Packs/day: 1.00     Years: 15.00    Smokeless tobacco: Not on file    Alcohol use Yes       History of biomedical complications associated with substance abuse:  unknown  Patient's current acceptance of treatment or motivation for change:poor  Family constellation: - 3 children     Is significant other involved?      Describe support system:      Describe living arrangements and home environment: may be homeless - she and her  have been evicted from their house   Health issues:   Hospital Problems  Date Reviewed: 11/18/2015          Codes Class Noted POA    * (Principal)Psychotic disorder ICD-10-CM: Z01  ICD-9-CM: 298.9  5/9/2018 Yes              Trauma history: none noted     Legal issues: no    History of  service: no  Financial status: disability     Jain/cultural factors: none noted     Education/work history:  unknown  Have you been licensed as a health care professional ( current or  ) : no  Leisure and recreation preferences:unknown   Describe coping skills:ineffectual   Ana Vicente  2018

## 2018-05-09 NOTE — ED NOTES
The documentation for this period is being entered following the guidelines as defined in the [facility] policy by Meghan Zuluaga RN.

## 2018-05-09 NOTE — PROGRESS NOTES
Problem: Altered Thought Process (Adult/Pediatric)5/13/2018  Goal: *STG: Remains safe in hospital  Outcome: Progressing Towards Goal  Since being released from restraints this am has had no aggressive out burst,Has been isolative and has declined to answer questions related to admission assessment. Goal: *STG: Decreased delusional thinking  Outcome: Progressing Towards Goal  Stated I HAVE THE ABILIITY TO PURIFY ALL TOXINS BY TOUCH\"  Goal: *STG: Decreased hallucinations  Outcome: Not Progressing Towards Goal  Appears to be responding to internal stimuli. Has been observed talking to self.     [facility] 60  Master Treatment Plan Doreen Rogers Sharp        Date Treatment Plan Initiated: 5/9/2019      Treatment Plan Modalities:    Type of Modality Amount  (x minutes) Frequency (x/week) Duration (x days) Name of Responsible Staff   Community & wrap-up meetings to encourage peer interactions    15    7    1      IVETH Funez   Group psychotherapy to assist in building coping skills and internal controls    60    7    1    Xander Stephens LCSW   Therapeutic activity groups to build coping skills    60    7    1    Xander Stephens LCSW   Psychoeducation in group setting to address:   Medication education    15    7    1     ANJANA Valenzuela RN   Coping skills    20    7    1    MAURICE Mariano RN   Relaxation techniques           Symptom management           Discharge planning    15    7    1    PRASHANT Ospina    Spirituality     60    7    1    jim COTE    60    7    1    Volunteer from [employer]   [facility]/AA/NA    60    7    1    Volunteer from [address] [place] [address] medication management    15    7    1    Dr. Veronica Klinefelter meeting/discharge planning

## 2018-05-09 NOTE — ED NOTES
40 yo  female with hx of substance abuse presenting escorted by [place] police officers under a ECO for evaluation prior to psychiatric admission. Per  pt was microwaving aluminum cans, offering blessings by licking hands and touching people and reporting that her blood is healing. Pt denies any other acute medical complaints. She denies headache, chest pain, SOB, abdominal pain, vomiting, diarrhea or urinary changes. ROS unable to obtain secondary to active psychiatric illness  Vital signs  Temp 97.9 F, /74, Pulse 100 , Resp16  SpO2 1000  WN disheveled appearing  female in NAD  Head: NC/AT  ENT: external ear nml, canal clear,  normal, normal nose, oropharynx clear without erythema or edema, midline uvula, good dentition  Card RRR without MRG  Lungs CTA throughout without adventitious sound  Abdomen: normal appearing, soft, diffuse tenderness >LLQ, no palpable organomegaly or mass  MSK: moving all extremities  Neuro: A and O x 2, CN grossly intact  Psych: Odd affect, inappropriate behavior, cooperative  40 yo  female with appearance of acute psychosis ? secondary to substance vs underlying psychiatric illness. Will check labs. [facility] reviewed[facility] Pt refusing abx.  Vera RICHARDS [place], [place]

## 2018-05-09 NOTE — BH NOTES
RESTRAINT DEBRIEFING FORM FOR BEHAVIOR MANAGEMENT STANDARD       Doreen Castano                                                                                               1. Did the patient request family involvement in the debriefing meeting: NO    2. Is patient/family involved in the debriefing: YES    3. Did patient request family be notified of application of restraint: NO  If YES, who was notified? Their perception of the event:     4. Date restraint applied: My 8 Time restraint applied:0515    5. Type of restraint applied: 4 Point    6. Who applied restraints (names):Trae/Melanie M/    7. Why was restraint applied: er attic ,volatile behavior    8. What led to the application of restraint:     9. What measures were tried prior to application of restraint: medication,redirection    10. During the time the patient was restrained, were the following addressed: Physical Well Being, YES, Psychological Comfort, YES, Right to Privacy, YES    11. Did the patient sustain any trauma from this incident: YES  If YES, explain:no   Did staff sustain any trauma from this incident: NO      12. Was patient restrained/secluded for more than 12 hours? NO  If YES, was Clinical Medical Director notified? YES  If YES, name of  on call notified: Jef Yusuf RN    13. Did patient have (2) or more separate behavior episodes within a 12 hour period? NO  If YES, was Clinical Medical Director notified? N/A  If YES, name of  on call notified:     15.  What staff members participated in the debriefing? Nirmala Hunter RN    15. What issues were identified as a result of the debriefing? Anger mannegment    16.  Based on the incident, was the patient's Care Plan modified: YES  If YES, explain:       RN Signature_______________________________________Date_______Time______  Patient's Signature___________________________________Date_______Time______

## 2018-05-09 NOTE — IP AVS SNAPSHOT
[address] [place] [address] 
[phone] Patient: Dannielle Pemberton MRN: [id number] About your hospitalization You were admitted on:  May 9, 2018 You last received care in the:  [facility] You were discharged on:  May 18, 2018 Why you were hospitalized Your primary diagnosis was:  Psychotic Disorder Follow-up Information Follow up With Details Comments Contact Info [address] [facility] on 2018 plan to go to [facility] to complete assessment and open your case  [facility] [address] [address] [phone] None   None ([phone]) Patient stated that they have no PCP Discharge Orders None A check marielle indicates which time of day the medication should be taken. My Medications START taking these medications Instructions Each Dose to Equal  
 Morning Noon Evening Bedtime  
 haloperidol 10 mg tablet Commonly known as:  HALDOL Your last dose was:  18   8 am  
Your next dose is:  18    8 pm  
Notes to Patient:  Scheduled twice a day:   8 am  &   8 pm  
   
 Take 1 Tab by mouth two (2) times a day. Indications: Psychotic Disorder 10 mg Where to Get Your Medications These medications were sent to [address], [address] [place] [address], [address] [place] [address] Phone:  [phone]  
  haloperidol 10 mg tablet Discharge Instructions DISCHARGE SUMMARY 
 
NAME:Doreen Biggs : 1980 MRN: [id number] The patient Doreen Biggs exhibits the ability to control behavior in a less restrictive environment. Patient's level of functioning is improving. No assaultive/destructive behavior has been observed for the past 24 hours.   No suicidal/homicidal threat or behavior has been observed for the past 24 hours. There is no evidence of serious medication side effects. Patient has not been in physical or protective restraints for at least the past 24 hours. If weapons involved, how are they secured? No weapons involved Is patient aware of and in agreement with discharge plan? Patient is aware of discharge and is in agreement Arrangements for medication:  Prescriptions filled at [facility] Referral for substance abuse treatment ? Yes, referred to [address] Referral for smoking cessation needed ? Yes, refused Copy of discharge instructions to  provider?:  Yes , fax to [address] Arrangements for transportation home:  Taxi to [address] Keep all follow up appointments as scheduled, continue to take prescribed medications per physician instructions. Mental health crisis number:  732 or your local mental health crisis line number at [phone] DISCHARGE SUMMARY from Nurse PATIENT INSTRUCTIONS: 
What to do at Home: 
Recommended activity: Activity as tolerated. If you experience any of the following symptoms hopeless helpless overwhelming thoughts of harming of harming self or others, uncontrolled racing thoughts or paranoid thoughts, please call 911 or your local mental health crisis line number at [phone]. *  Please give a list of your current medications to your Primary Care Provider. *  Please update this list whenever your medications are discontinued, doses are 
    changed, or new medications (including over-the-counter products) are added. *  Please carry medication information at all times in case of emergency situations. These are general instructions for a healthy lifestyle: No smoking/ No tobacco products/ Avoid exposure to second hand smoke Surgeon General's Warning:  Quitting smoking now greatly reduces serious risk to your health. Obesity, smoking, and sedentary lifestyle greatly increases your risk for illness A healthy diet, regular physical exercise & weight monitoring are important for maintaining a healthy lifestyle You may be retaining fluid if you have a history of heart failure or if you experience any of the following symptoms:  Weight gain of 3 pounds or more overnight or 5 pounds in a week, increased swelling in our hands or feet or shortness of breath while lying flat in bed. Please call your doctor as soon as you notice any of these symptoms; do not wait until your next office visit. Recognize signs and symptoms of STROKE: 
 
F-face looks uneven A-arms unable to move or move unevenly S-speech slurred or non-existent T-time-call 911 as soon as signs and symptoms begin-DO NOT go Back to bed or wait to see if you get better-TIME IS BRAIN. Warning Signs of HEART ATTACK Call 911 if you have these symptoms: 
? Chest discomfort. Most heart attacks involve discomfort in the center of the chest that lasts more than a few minutes, or that goes away and comes back. It can feel like uncomfortable pressure, squeezing, fullness, or pain. ? Discomfort in other areas of the upper body. Symptoms can include pain or discomfort in one or both arms, the back, neck, jaw, or stomach. ? Shortness of breath with or without chest discomfort. ? Other signs may include breaking out in a cold sweat, nausea, or lightheadedness. Don't wait more than five minutes to call [address]! Fast action can save your life. Calling 911 is almost always the fastest way to get lifesaving treatment. Emergency Medical Services staff can begin treatment when they arrive  up to an hour sooner than if someone gets to the hospital by car. The discharge information has been reviewed with the patient. The patient verbalized understanding.  
Discharge medications reviewed with the patient and appropriate educational materials and side effects teaching were provided. ___________________________________________________________________________________________________________________________________ Introducing [facility]! Chyna Catalan introduces Next Jump patient portal. Now you can access parts of your medical record, email your doctor's office, and request medication refills online. 1. In your internet browser, go to [email or web address] 2. Click on the First Time User? Click Here link in the Sign In box. You will see the New Member Sign Up page. 3. Enter your Next Jump Access Code exactly as it appears below. You will not need to use this code after youve completed the sign-up process. If you do not sign up before the expiration date, you must request a new code. · Next Jump Access Code: [email or web address] Expires: 8/7/2018  4:26 AM 
 
4. Enter the last four digits of your Social Security Number (xxxx) and Date of Birth (mm/dd/yyyy) as indicated and click Submit. You will be taken to the next sign-up page. 5. Create a Collective IPt ID. This will be your Next Jump login ID and cannot be changed, so think of one that is secure and easy to remember. 6. Create a Next Jump password. You can change your password at any time. 7. Enter your Password Reset Question and Answer. This can be used at a later time if you forget your password. 8. Enter your e-mail address. You will receive e-mail notification when new information is available in [address] 9. Click Sign Up. You can now view and download portions of your medical record. 10. Click the Download Summary menu link to download a portable copy of your medical information. If you have questions, please visit the Frequently Asked Questions section of the Next Jump website. Remember, Next Jump is NOT to be used for urgent needs. For medical emergencies, dial 911. Now available from your iPhone and Android! Introducing vEer Stewart As a [facility] [employer] patient, I wanted to make you aware of our electronic visit tool called Ever Stewart. SafetyTat 24/7 allows you to connect within minutes with a medical provider 24 hours a day, seven days a week via a mobile device or tablet or logging into a secure website from your computer. You can access Ever Stewart from anywhere in the [place]. A virtual visit might be right for you when you have a simple condition and feel like you just dont want to get out of bed, or cant get away from work for an appointment, when your regular [facility] provider is not available (evenings, weekends or holidays), or when youre out of town and need minor care. Electronic visits cost only $49 and if the SafetyTat 24/7 provider determines a prescription is needed to treat your condition, one can be electronically transmitted to a nearby pharmacy*. Please take a moment to enroll today if you have not already done so. The enrollment process is free and takes just a few minutes. To enroll, please download the SafetyTat 24/7 martha to your tablet or phone, or visit [email or web address] to enroll on your computer. And, as an [address] [place] [address] patient with a [employer] account, the results of your visits will be scanned into your electronic medical record and your primary care provider will be able to view the scanned results. We urge you to continue to see your regular [facility] provider for your ongoing medical care. And while your primary care provider may not be the one available when you seek a Ever Stewart virtual visit, the peace of mind you get from getting a real diagnosis real time can be priceless. For more information on Ever Stewart, view our Frequently Asked Questions (FAQs) at [email or web address]. Sincerely, 
 
Arthur Orellana MD 
Chief Medical Officer [facility] Carol Reaves *:  certain medications cannot be prescribed via Ever Stewart Providers Seen During Your Hospitalization Provider Specialty Primary office phone Becky Temple MD Emergency Medicine [phone] Nicolasa Pritchett MD Psychiatry [phone] Your Primary Care Physician (PCP) Primary Care Physician Office Phone Office Fax NONE ** None ** ** None ** You are allergic to the following Allergen Reactions Latex Rash Codeine Rash Recent Documentation Height Weight Breastfeeding? BMI OB Status Smoking Status 1.626 m 51.3 kg No 19.41 kg/m2 Recent pregnancy Current Every Day Smoker Emergency Contacts Name Discharge Info Relation Home Work Mobile Lloyd Castano DISCHARGE CAREGIVER [3] Spouse [3] [phone] Patient Belongings The following personal items are in your possession at time of discharge: 
     Visual Aid: None          Jewelry: Necklace (locked in med cabinet)       Other Valuables:  (scraf, glove) Please provide this summary of care documentation to your next provider. Signatures-by signing, you are acknowledging that this After Visit Summary has been reviewed with you and you have received a copy. Patient Signature:  ____________________________________________________________ Date:  ____________________________________________________________  
  
Leydi Louise Provider Signature:  ____________________________________________________________ Date:  ____________________________________________________________

## 2018-05-10 LAB
ALBUMIN SERPL-MCNC: 3.5 G/DL (ref 3.5–5)
ALBUMIN/GLOB SERPL: 1.1 {RATIO} (ref 1.1–2.2)
ALP SERPL-CCNC: 83 U/L (ref 45–117)
ALT SERPL-CCNC: 26 U/L (ref 12–78)
ANION GAP SERPL CALC-SCNC: 10 MMOL/L (ref 5–15)
APAP SERPL-MCNC: <2 UG/ML (ref 10–30)
APPEARANCE UR: ABNORMAL
AST SERPL-CCNC: 15 U/L (ref 15–37)
BACTERIA URNS QL MICRO: ABNORMAL /HPF
BASOPHILS # BLD: 0 K/UL (ref 0–0.1)
BASOPHILS NFR BLD: 1 % (ref 0–1)
BILIRUB SERPL-MCNC: 0.3 MG/DL (ref 0.2–1)
BILIRUB UR QL: NEGATIVE
BUN SERPL-MCNC: 13 MG/DL (ref 6–20)
BUN/CREAT SERPL: 19 (ref 12–20)
CALCIUM SERPL-MCNC: 8.7 MG/DL (ref 8.5–10.1)
CAOX CRY URNS QL MICRO: ABNORMAL
CHLORIDE SERPL-SCNC: 105 MMOL/L (ref 97–108)
CO2 SERPL-SCNC: 26 MMOL/L (ref 21–32)
COLOR UR: ABNORMAL
CREAT SERPL-MCNC: 0.67 MG/DL (ref 0.55–1.02)
DIFFERENTIAL METHOD BLD: NORMAL
EOSINOPHIL # BLD: 0.3 K/UL (ref 0–0.4)
EOSINOPHIL NFR BLD: 4 % (ref 0–7)
EPITH CASTS URNS QL MICRO: ABNORMAL /LPF
ERYTHROCYTE [DISTWIDTH] IN BLOOD BY AUTOMATED COUNT: 13 % (ref 11.5–14.5)
ETHANOL SERPL-MCNC: <10 MG/DL
GLOBULIN SER CALC-MCNC: 3.3 G/DL (ref 2–4)
GLUCOSE SERPL-MCNC: 75 MG/DL (ref 65–100)
GLUCOSE UR STRIP.AUTO-MCNC: NEGATIVE MG/DL
HCG UR QL: NEGATIVE
HCT VFR BLD AUTO: 39.5 % (ref 35–47)
HGB BLD-MCNC: 12.7 G/DL (ref 11.5–16)
HGB UR QL STRIP: NEGATIVE
IMM GRANULOCYTES # BLD: 0 K/UL (ref 0–0.04)
IMM GRANULOCYTES NFR BLD AUTO: 0 % (ref 0–0.5)
KETONES UR QL STRIP.AUTO: NEGATIVE MG/DL
LEUKOCYTE ESTERASE UR QL STRIP.AUTO: ABNORMAL
LYMPHOCYTES # BLD: 2.7 K/UL (ref 0.8–3.5)
LYMPHOCYTES NFR BLD: 32 % (ref 12–49)
MCH RBC QN AUTO: 31.5 PG (ref 26–34)
MCHC RBC AUTO-ENTMCNC: 32.2 G/DL (ref 30–36.5)
MCV RBC AUTO: 98 FL (ref 80–99)
MONOCYTES # BLD: 0.5 K/UL (ref 0–1)
MONOCYTES NFR BLD: 6 % (ref 5–13)
NEUTS SEG # BLD: 5 K/UL (ref 1.8–8)
NEUTS SEG NFR BLD: 58 % (ref 32–75)
NITRITE UR QL STRIP.AUTO: NEGATIVE
NRBC # BLD: 0 K/UL (ref 0–0.01)
NRBC BLD-RTO: 0 PER 100 WBC
PH UR STRIP: 6.5 [PH] (ref 5–8)
PLATELET # BLD AUTO: 316 K/UL (ref 150–400)
PMV BLD AUTO: 9.2 FL (ref 8.9–12.9)
POTASSIUM SERPL-SCNC: 4 MMOL/L (ref 3.5–5.1)
PROT SERPL-MCNC: 6.8 G/DL (ref 6.4–8.2)
PROT UR STRIP-MCNC: NEGATIVE MG/DL
RBC # BLD AUTO: 4.03 M/UL (ref 3.8–5.2)
RBC #/AREA URNS HPF: ABNORMAL /HPF (ref 0–5)
SALICYLATES SERPL-MCNC: <1.7 MG/DL (ref 2.8–20)
SODIUM SERPL-SCNC: 141 MMOL/L (ref 136–145)
SP GR UR REFRACTOMETRY: 1.02 (ref 1–1.03)
UROBILINOGEN UR QL STRIP.AUTO: 1 EU/DL (ref 0.2–1)
WBC # BLD AUTO: 8.6 K/UL (ref 3.6–11)
WBC URNS QL MICRO: >100 /HPF (ref 0–4)

## 2018-05-10 PROCEDURE — 74011250637 HC RX REV CODE- 250/637

## 2018-05-10 PROCEDURE — 74011250637 HC RX REV CODE- 250/637: Performed by: PSYCHIATRY & NEUROLOGY

## 2018-05-10 PROCEDURE — 65220000003 HC RM SEMIPRIVATE PSYCH

## 2018-05-10 RX ADMIN — ZOLPIDEM TARTRATE 5 MG: 5 TABLET ORAL at 20:46

## 2018-05-10 RX ADMIN — OLANZAPINE 2.5 MG: 2.5 TABLET, FILM COATED ORAL at 08:47

## 2018-05-10 RX ADMIN — OLANZAPINE 2.5 MG: 2.5 TABLET, FILM COATED ORAL at 20:46

## 2018-05-10 RX ADMIN — ACETAMINOPHEN 650 MG: 325 TABLET, FILM COATED ORAL at 08:59

## 2018-05-10 NOTE — BH NOTES
GROUP THERAPY PROGRESS NOTE    The patient Doreen Alonzo is participating in AdverseEvents. Group time: 30 minutes    Personal goal for participation: to orient the patient to the unit.     Goal orientation: successful adoption of unit rules    Group therapy participation: active    Therapeutic interventions reviewed and discussed: Yes    Impression of participation:     Julius Thornton  5/10/2018 9:36 AM

## 2018-05-10 NOTE — PROGRESS NOTES
Problem: Falls - Risk of  Goal: *Absence of Falls  Document Seun Fall Risk and appropriate interventions in the flowsheet. Outcome: Progressing Towards Goal  Fall Risk Interventions:            Medication Interventions: Teach patient to arise slowly       Resting in bed with eyes closed, no complaints, no distress noted. Safety measures in place, will continue to monitor.

## 2018-05-10 NOTE — PROGRESS NOTES
Problem: Altered Thought Process (Adult/Pediatric)  Goal: *STG: Participates in treatment plan  Outcome: Not Progressing Towards Goal  Pt has remained isolative in room, despite much encouragement from staff to come out of room.  Fluids left at bedside

## 2018-05-10 NOTE — PROGRESS NOTES
Problem: Falls - Risk of  Goal: *Absence of Falls  Document Seun Fall Risk and appropriate interventions in the flowsheet. Outcome: Progressing Towards Goal  Fall Risk Interventions:   non slip socks bed in low position         Medication Interventions: Teach patient to arise slowly                  Problem: Altered Thought Process (Adult/Pediatric)  Goal: *STG: Participates in treatment plan  Variance: Patient Condition  Pt. Met in treatment team with Dr. Danya Coats   Disorganized      Paranoid    Poor historian   Adelina OSUNA  Scheduled for 5/11/2018  Goal: *STG: Complies with medication therapy  Variance: Patient Condition  Refusing medications  Goal: *STG: Decreased delusional thinking  Variance: Patient Condition  Pt. Remains paranoid       Thought blocking     Goal: Interventions  Variance: Patient Condition  Will continue to monitor  On 15 min.  Checks for safety   Will

## 2018-05-10 NOTE — BH NOTES
PRN Medication Documentation    Specific patient behavior that led to need for PRN medication: staff attempted to obtain vital signs, and assist patient in removing multiple layers of clothing as patient was noted to be perfusely sweating, pt began clinching fist, cursing at staff and esaclating in agitation  Staff interventions attempted prior to PRN being given: informing, listening presence, reasruance  PRN medication given: ativan 2mg IM and Geodon 20 IM at 1601  Patient response/effectiveness of PRN medication: 30 minutes post administration patient lay quietly in bed

## 2018-05-10 NOTE — PROGRESS NOTES
Problem: Falls - Risk of  Goal: *Absence of Falls  Document Seun Fall Risk and appropriate interventions in the flowsheet. Outcome: Progressing Towards Goal  Fall Risk Interventions:Gait is steady. No falls observed or reported. Medication Interventions: Teach patient to arise slowly                  Problem: Altered Thought Process (Adult/Pediatric)  Goal: *STG: Seeks staff when feelings of anxiety and fear arise  Outcome: Not Progressing Towards Goal  Is isolative. Minimal self disclosure. Declines to attend groups frequently. Goal: *STG: Complies with medication therapy  Outcome: Progressing Towards Goal  Was po scheduled medication compliant am.  Goal: *STG: Decreased hallucinations  Outcome: Not Progressing Towards Goal  Denies AH/VH margy does appear distracted and guarded.

## 2018-05-10 NOTE — BH NOTES
Second Opinion for Forced Medications 5/10/18-  Ms. Nolberto Mackey is involuntarily admitted for severe psychosis. Her condition will not improve without antipsychotic medication. Due to her psychosis, she is paranoid , aggressive and is unable to care for herself. Without forced medications she will continue to present a danger to self/ others.   Azeem Rowell M.D.

## 2018-05-10 NOTE — BH NOTES
GROUP THERAPY PROGRESS NOTE    Doreen Coats participated in the Acute Unit's afternoon Process Group for yesterday, with a focus on identifying feelings, planning for the rest of the day, and preparing for discharge. Group time: 25 minutes. Personal goal for participation: To increase the capacity to improve ones mood and structure. Goal orientation: The patient will be able to identify their feelings, develop a plan for structuring their day, and discharge planning. Group therapy participation: With prompting, this patient participated in the group. Therapeutic interventions reviewed and discussed: The group members were asked to introduce themselves to each other and to see if they could identify an emotion they are having and/or let the group know what they want to focus on for the day as they continue to make discharge plans. Impression of participation: The patient said was \"confused. Saeid Rondon I want to go home. \" She added that she thought the \"squaters in her home had kicked her out\" and that they were practicing \"witchcraft,\" even while she is here in the hospital. She also indicated that she had something she wanted to work out with her . The patient expressed no current SI/HI and may have been responding to internal stimuli. Her affect was anxious and paranoid. Her thinking sounded as if it were delusional, with ideas of reference. Her mood reflected her affect. She was encouraged to let the staff know if she became afraid and that our primary concern is to help ensure safety for the patients on the unit. This was the first process group for this patient with the undersigned.

## 2018-05-10 NOTE — INTERDISCIPLINARY ROUNDS
Behavioral Health Interdisciplinary Rounds     Patient Name: Angel Shelby  Age: 41 y.o. Room/Bed:  732/02  Primary Diagnosis: Psychotic disorder   Admission Status: TDO     Readmission within 30 days: no  Power of  in place: no  Patient requires a blocked bed: yes          Reason for blocked bed: Behavior    VTE Prophylaxis: Not indicated    Mobility needs/Fall risk: no    Nutritional Plan: no  Consults:          Labs/Testing due today?: yes-refused    Sleep hours:  13      Participation in Care/Groups:  no  Medication Compliant?: Refusing Psychiatric Medications  PRNS (last 24 hours): Antipsychotic (IM) and Antianxiety    Restraints (last 24 hours):  yes     CIWA (range last 24 hours):     COWS (range last 24 hours):      Alcohol screening (AUDIT) completed -         If applicable, date SBIRT discussed in treatment team AND documented:     Tobacco - patient is a smoker: Have You Used Tobacco in the Past 30 Days: Yes  Illegal Drugs use: Have You Used Any Illegal Substances Over the Past 12 Months: Yes    24 hour chart check complete: yes     Patient goal(s) for today:   Treatment team focus/goals: Plan for hearing tomorrow. Progress note - She remains easily agitated and refused to answer some questions in treatment team.  She is refusing to call her family , believes they put her here .       LOS:  1  Expected LOS: TBD     Financial concerns/prescription coverage:  Request a tomym form [place]   Date of last family contact:      Family requesting physician contact today:    Discharge plan: homeless   Guns in the home: no      Outpatient provider(s): [address]     Participating treatment team members: Doreen Amaya RN

## 2018-05-11 LAB
BACTERIA SPEC CULT: ABNORMAL
BACTERIA SPEC CULT: ABNORMAL
CC UR VC: ABNORMAL
SERVICE CMNT-IMP: ABNORMAL

## 2018-05-11 PROCEDURE — 65220000003 HC RM SEMIPRIVATE PSYCH

## 2018-05-11 PROCEDURE — 74011250637 HC RX REV CODE- 250/637: Performed by: PSYCHIATRY & NEUROLOGY

## 2018-05-11 RX ORDER — OLANZAPINE 2.5 MG/1
2.5 TABLET ORAL 2 TIMES DAILY
Status: DISCONTINUED | OUTPATIENT
Start: 2018-05-11 | End: 2018-05-12

## 2018-05-11 RX ORDER — HALOPERIDOL 5 MG/ML
5 INJECTION INTRAMUSCULAR 2 TIMES DAILY
Status: DISCONTINUED | OUTPATIENT
Start: 2018-05-11 | End: 2018-05-12

## 2018-05-11 RX ADMIN — OLANZAPINE 2.5 MG: 2.5 TABLET, FILM COATED ORAL at 09:22

## 2018-05-11 RX ADMIN — OLANZAPINE 2.5 MG: 2.5 TABLET, FILM COATED ORAL at 17:58
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
## 2018-05-11 NOTE — INTERDISCIPLINARY ROUNDS
Behavioral Health Interdisciplinary Rounds     Patient Name: Janine Ervin  Age: 41 y.o.   Room/Bed:  732/02  Primary Diagnosis: Psychotic disorder   Admission Status: TDO     Readmission within 30 days: no  Power of  in place: no  Patient requires a blocked bed: yes          Reason for blocked bed: Behavior    VTE Prophylaxis: Not indicated    Mobility needs/Fall risk: no    Nutritional Plan: no  Consults:          Labs/Testing due today?: yes-refused    Sleep hours: 8.15       Participation in Care/Groups:  yes  Medication Compliant?: Yes  PRNS (last 24 hours): Sleep Aid and Pain    Restraints (last 24 hours):  no     CIWA (range last 24 hours):     COWS (range last 24 hours):      Alcohol screening (AUDIT) completed -         If applicable, date SBIRT discussed in treatment team AND documented:     Tobacco - patient is a smoker: Have You Used Tobacco in the Past 30 Days: Yes  Illegal Drugs use: Have You Used Any Illegal Substances Over the Past 12 Months: Yes    24 hour chart check complete: yes     Patient goal(s) for today: Comply with tx   Treatment team focus/goals: Deniseal meds   Progress note: Disorganized, no insight into her illness nor able to begin d/c planning     LOS:  2  Expected LOS:     Financial concerns/prescription coverage:    Date of last family contact:      Family requesting physician contact today:   Discharge plan: TBD- Homeless (?)   Guns in the home:        Outpatient provider(s): [facility]    Participating treatment team members: Dr Mary Ann Ochoa and Randell Bronson RN

## 2018-05-11 NOTE — BH NOTES
Pt became defensive during treatment team. Shanice Luu \"I am not crazy, I am leaving today\"  Pt encouraged to attend groups and take her medication as steps towards working to discharge.

## 2018-05-11 NOTE — PROGRESS NOTES
Problem: Altered Thought Process (Adult/Pediatric)  Goal: *STG: Remains safe in hospital  Outcome: Progressing Towards Goal  Pt remains safe in hospital on q 15 min safety checks. No self harming or aggressive behaviors towards others oberved. Pt isolates in room majority of shift. Goal: *STG: Complies with medication therapy  Outcome: Progressing Towards Goal  Pt on court ordered medications. Pt medication compliant. Goal: *STG: Attends activities and groups  Outcome: Not Progressing Towards Goal  Pt encouraged to attend groups and activities. Pt now on court ordered lab work orders. Fasting labs for morning.

## 2018-05-11 NOTE — PROGRESS NOTES
Problem: Altered Thought Process (Adult/Pediatric)  Goal: *STG: Complies with medication therapy  Outcome: Progressing Towards Goal  Pt has been taking all scheduled medications. She has not required any PRN medications. Pt has been appropriate in interactions with staff and peers.

## 2018-05-11 NOTE — BH NOTES
GROUP THERAPY PROGRESS NOTE    Doreen Souza did not participate in a 60 minute Acute Unit's Process Group, with a focus identifying feelings, planning for the rest of the day, and discussing discharge.

## 2018-05-11 NOTE — PROGRESS NOTES
Problem: Falls - Risk of  Goal: *Absence of Falls  Document Seun Fall Risk and appropriate interventions in the flowsheet. Outcome: Progressing Towards Goal  Fall Risk Interventions:            Medication Interventions: Teach patient to arise slowly       Resting in bed with eyes closed, no complaints, no distress noted. Safety measures in place, will continue to monitor.     0600-Refused am labs
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
## 2018-05-11 NOTE — BH NOTES
GRATITUDE GROUP THERAPY PROGRESS NOTE    The patient Doreen Omer a 41 y.o. female is participating in Gratitude Group.     Group time: 15 minutes    Personal goal for participation: Appreciation of all things big and small    Goal orientation: Gratitude exercises    Group therapy participation: active    Therapeutic interventions reviewed and discussed:  Yes    Impression of participation: active    Aye Burden  5/10/2018  8:32 PM

## 2018-05-12 LAB
CHOLEST SERPL-MCNC: 183 MG/DL
EST. AVERAGE GLUCOSE BLD GHB EST-MCNC: 120 MG/DL
GLUCOSE P FAST SERPL-MCNC: 98 MG/DL (ref 65–100)
HBA1C MFR BLD: 5.8 % (ref 4.2–6.3)
HDLC SERPL-MCNC: 51 MG/DL
HDLC SERPL: 3.6 {RATIO} (ref 0–5)
LDLC SERPL CALC-MCNC: 105.4 MG/DL (ref 0–100)
LIPID PROFILE,FLP: ABNORMAL
TRIGL SERPL-MCNC: 133 MG/DL (ref ?–150)
VLDLC SERPL CALC-MCNC: 26.6 MG/DL

## 2018-05-12 PROCEDURE — 74011250637 HC RX REV CODE- 250/637: Performed by: PSYCHIATRY & NEUROLOGY

## 2018-05-12 PROCEDURE — 83036 HEMOGLOBIN GLYCOSYLATED A1C: CPT | Performed by: PSYCHIATRY & NEUROLOGY

## 2018-05-12 PROCEDURE — 80061 LIPID PANEL: CPT | Performed by: PSYCHIATRY & NEUROLOGY

## 2018-05-12 PROCEDURE — 36415 COLL VENOUS BLD VENIPUNCTURE: CPT | Performed by: PSYCHIATRY & NEUROLOGY

## 2018-05-12 PROCEDURE — 65220000003 HC RM SEMIPRIVATE PSYCH

## 2018-05-12 PROCEDURE — 74011250637 HC RX REV CODE- 250/637

## 2018-05-12 PROCEDURE — 82947 ASSAY GLUCOSE BLOOD QUANT: CPT | Performed by: PSYCHIATRY & NEUROLOGY

## 2018-05-12 RX ORDER — HALOPERIDOL 5 MG/ML
5 INJECTION INTRAMUSCULAR 2 TIMES DAILY
Status: DISCONTINUED | OUTPATIENT
Start: 2018-05-12 | End: 2018-05-15

## 2018-05-12 RX ORDER — OLANZAPINE 5 MG/1
5 TABLET ORAL 2 TIMES DAILY
Status: DISCONTINUED | OUTPATIENT
Start: 2018-05-12 | End: 2018-05-15

## 2018-05-12 RX ADMIN — OLANZAPINE 2.5 MG: 2.5 TABLET, FILM COATED ORAL at 09:30

## 2018-05-12 RX ADMIN — OLANZAPINE 5 MG: 5 TABLET, FILM COATED ORAL at 17:03
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
## 2018-05-12 NOTE — PROGRESS NOTES
Problem: Falls - Risk of  Goal: *Absence of Falls  Document Seun Fall Risk and appropriate interventions in the flowsheet. Outcome: Progressing Towards Goal  Fall Risk Interventions:  Non slip socks given to patient. Patient asleep in bed at this time. Continue to monitor q 15 minutes for safety.        Medication Interventions: Teach patient to arise slowly     Patient allowed lab to be drawn this am.

## 2018-05-12 NOTE — BH NOTES
Received pt on unit  No voiced complaints or acute distress at present  Note no agitation thus far  Pt isolative this evening,when approach by staff pt states\"YOU KNOW I AM NOT A PATIENT HERE! \"

## 2018-05-12 NOTE — BH NOTES
GROUP THERAPY PROGRESS NOTE    The patient Melody L Marylen Highman is participating in [employer]. Group time: 30 minutes    Personal goal for participation: to orient the patient to the unit.     Goal orientation: successful adoption of unit rules    Group therapy participation: active    Therapeutic interventions reviewed and discussed: Yes    Impression of participation:     Xuan Granados  5/12/2018 10:22 AM

## 2018-05-12 NOTE — BH NOTES
Behavioral Health Interdisciplinary Rounds     Patient Name: Dhaval Fitzgerald  Age: 41 y.o.   Room/Bed:  732/02  Primary Diagnosis: Psychotic disorder   Admission Status: TDOInvoluntarily Committed      Readmission within 30 days: no  Power of  in place: no  Patient requires a blocked bed: yes          Reason for blocked bed: behavior(aggression)    VTE Prophylaxis: Not indicated    Mobility needs/Fall risk: no    Nutritional Plan: no  Consults:          Labs/Testing due today?: yes    Sleep hours: 8       Participation in Care/Groups:  no  Medication Compliant?: Yes taking court ordered meds  PRNS (last 24 hours): None    Restraints (last 24 hours): No       CIWA (range last 24 hours):     COWS (range last 24 hours):      Alcohol screening (AUDIT) completed -   AUDIT Score: 0     If applicable, date SBIRT discussed in treatment team AND documented:     Tobacco - patient is a smoker: Have You Used Tobacco in the Past 30 Days: Yes  Illegal Drugs use: Have You Used Any Illegal Substances Over the Past 12 Months: Yes    24 hour chart check complete: yes     Patient goal(s) for today:   Treatment team focus/goals:   Progress note     LOS:  3  Expected LOS:     Financial concerns/prescription coverage:    Date of last family contact:       Family requesting physician contact today:    Discharge plan:   Guns in the home:         Outpatient provider(s):     Participating treatment team members: Dhaval Fitzgerald, * (assigned SW),

## 2018-05-12 NOTE — PROGRESS NOTES
Problem: Altered Thought Process (Adult/Pediatric)  Goal: *STG: Remains safe in hospital  Outcome: Progressing Towards Goal  Pt continues to be paranoid, delusional and distracted. During court ordered med administration Pt states to staff \" I'm not a patient here you know! \" insight and judgment continues to be poor. Staff will continue to monitor q 15 min checks.

## 2018-05-12 NOTE — PROGRESS NOTES
Problem: Falls - Risk of  Goal: *Absence of Falls  Document Seun Fall Risk and appropriate interventions in the flowsheet. Outcome: Progressing Towards Goal  Fall Risk Interventions:  Medication Interventions: Teach patient to arise slowly       Pt remains absent of falls. Ambulates with steady gait. Fall prevention education provided. Pt distracted irritable. Labile. Problem: Altered Thought Process (Adult/Pediatric)  Goal: *STG: Remains safe in hospital  Outcome: Progressing Towards Goal  Pt remains safe in hospital on q 15 min safety checks. No self harming behaviors observed. Pt distracted paranoid delusional. Isolating to room. Poor insight, impaired judgement. Goal: *STG: Attends activities and groups  Outcome: Not Progressing Towards Goal  Pt encourage to attend groups and activities. Pt paranoid, suspicious. religiously preoccupied. Distracted. Pt states she is on a mission to collect \"[phone]\" for the purpose of \"glorifying the lord\". Disorganized loose thought process. Medication compliant. Poor insight.

## 2018-05-13 PROCEDURE — 74011250637 HC RX REV CODE- 250/637

## 2018-05-13 PROCEDURE — 65220000003 HC RM SEMIPRIVATE PSYCH

## 2018-05-13 RX ADMIN — OLANZAPINE 5 MG: 5 TABLET, FILM COATED ORAL at 17:14

## 2018-05-13 RX ADMIN — OLANZAPINE 5 MG: 5 TABLET, FILM COATED ORAL at 09:00

## 2018-05-13 NOTE — PROGRESS NOTES
Problem: Falls - Risk of  Goal: *Absence of Falls  Document Seun Fall Risk and appropriate interventions in the flowsheet. Outcome: Progressing Towards Goal  Fall Risk Interventions:        non slip socks  Bed in low position     Medication Interventions: Teach patient to arise slowly                  Problem: Altered Thought Process (Adult/Pediatric)  Goal: *STG: Participates in treatment plan  Pt. Met in treatment team with Dr. Landon Lam drug use   Religiously preoccupied   \"These are the Last Days\"  \"I'm staying in my room to purify the enemy\"  \"The enemy stole me\"  When asked where she thought she was pt. Stated  \"I'm in [place]\"  Goal: *STG: Complies with medication therapy  Pt. On court ordered medications      Compliant with encouragement   zyprexa  Increased yesterday   Reviewed in treatment team   Goal: *STG: Decreased delusional thinking  Pt. Remains delusional  Religiously preoccupied  Isolates in her room except for meals   Goal: Interventions  Will continue to monitor  On 15 min.  Checks for safety  Assess thought process    Medication compliance     effectiveness  Encourage groups on unit

## 2018-05-13 NOTE — PROGRESS NOTES
Problem: Altered Thought Process (Adult/Pediatric)  Goal: *STG: Complies with medication therapy  Outcome: Progressing Towards Goal  Pt is paranoid, religiously preoccupied, delusional. States she is not in the hospital she in in [place]. Pt is irritable and labile. Pt denies pain. Poor insight. Poor safety awareness. Pt is medication and meal compliant. Praying for staff members. Poor situational awareness. \"I hope I am not here by morning\" states pt. No self harming behaviors observed. Denies SI. Pt request nursing staff cut knot out of her hair. Education provided. Shampoo and conditioner provided. Pt working on removing knots from hair.

## 2018-05-13 NOTE — PROGRESS NOTES
Problem: Altered Thought Process (Adult/Pediatric)  Goal: *STG: Complies with medication therapy  Outcome: Progressing Towards Goal  Pt alert oriented to self. Religiously preoccupied. Delusional. Pt states \"I am not in the hospital\"  \" I am in a safe place\". Appears defiant and confrontational. Isolating in room. No insight. Pt denies burning or any discomfort with urination. Denies pain or discomfort.

## 2018-05-13 NOTE — BH NOTES
Behavioral Health Interdisciplinary Rounds     Patient Name: Layton Dai  Age: 41 y.o.   Room/Bed:  2/  Primary Diagnosis: Psychotic disorder   Admission Status: TDO     Readmission within 30 days: no  Power of  in place: no  Patient requires a blocked bed: yes          Reason for blocked bed: behavior(aggressive)    VTE Prophylaxis: Not indicated    Mobility needs/Fall risk: no    Nutritional Plan: no  Consults:          Labs/Testing due today?: no    Sleep hours:        Participation in Care/Groups:  selective  Medication Compliant?: Yes  PRNS (last 24 hours): None    Restraints (last 24 hours):  no     CIWA (range last 24 hours):     COWS (range last 24 hours):      Alcohol screening (AUDIT) completed -   AUDIT Score: 0     If applicable, date SBIRT discussed in treatment team AND documented:     Tobacco - patient is a smoker: Have You Used Tobacco in the Past 30 Days: Yes  Illegal Drugs use: Have You Used Any Illegal Substances Over the Past 12 Months: Yes    24 hour chart check complete: yes     Patient goal(s) for today:   Treatment team focus/goals:   Progress note     LOS:  4  Expected LOS:     Financial concerns/prescription coverage:    Date of last family contact:       Family requesting physician contact today:    Discharge plan:   Guns in the home:         Outpatient provider(s):     Participating treatment team members: Layton Garymagda, * (assigned SW),

## 2018-05-13 NOTE — BH NOTES
Behavioral Health Interdisciplinary Rounds     Patient Name: Nichol Grayson  Age: 41 y.o.   Room/Bed:  732/  Primary Diagnosis: Psychotic disorder   Admission Status: TDO     Readmission within 30 days: no  Power of  in place: no  Patient requires a blocked bed: yes          Reason for blocked bed: behavior(aggressive)    VTE Prophylaxis: Not indicated    Mobility needs/Fall risk: no    Nutritional Plan: no  Consults:          Labs/Testing due today?: no    Sleep hours: 12.15       Participation in Care/Groups:  selective  Medication Compliant?: Yes  PRNS (last 24 hours): None    Restraints (last 24 hours):  no     CIWA (range last 24 hours):     COWS (range last 24 hours):      Alcohol screening (AUDIT) completed -   AUDIT Score: 0     If applicable, date SBIRT discussed in treatment team AND documented:     Tobacco - patient is a smoker: Have You Used Tobacco in the Past 30 Days: Yes  Illegal Drugs use: Have You Used Any Illegal Substances Over the Past 12 Months: Yes    24 hour chart check complete: yes     Patient goal(s) for today:   Treatment team focus/goals:   Progress note     LOS:  4  Expected LOS:     Financial concerns/prescription coverage:    Date of last family contact:       Family requesting physician contact today:    Discharge plan:   Guns in the home:         Outpatient provider(s):     Participating treatment team members: Nichol Grayson, * (assigned SW),
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
## 2018-05-13 NOTE — PROGRESS NOTES
Problem: Altered Thought Process (Adult/Pediatric)  Goal: *STG: Remains safe in hospital  Outcome: Progressing Towards Goal  Patient asleep in bed at this time. Respirations regular and even. Continue to monitor q 15 minutes for safety.

## 2018-05-13 NOTE — BH NOTES
GROUP THERAPY PROGRESS NOTE    Doreen Romero is participating in [place]. Group time: 15 minutes    Personal goal for participation: go home. Goal orientation: personal    Group therapy participation: active    Therapeutic interventions reviewed and discussed: Writer listened attentively and explained unit routine. Impression of participation: Patient participated actively.

## 2018-05-14 PROCEDURE — 65220000003 HC RM SEMIPRIVATE PSYCH

## 2018-05-14 PROCEDURE — 74011250637 HC RX REV CODE- 250/637

## 2018-05-14 RX ADMIN — OLANZAPINE 5 MG: 5 TABLET, FILM COATED ORAL at 08:31

## 2018-05-14 RX ADMIN — OLANZAPINE 5 MG: 5 TABLET, FILM COATED ORAL at 17:06

## 2018-05-14 NOTE — INTERDISCIPLINARY ROUNDS
Behavioral Health Interdisciplinary Rounds     Patient Name: Rianna Ivory  Age: 41 y.o. Room/Bed:  732/02  Primary Diagnosis: Psychotic disorder   Admission Status: TDO     Readmission within 30 days: no  Power of  in place: no  Patient requires a blocked bed: yes          Reason for blocked bed: Aggressive behavior    VTE Prophylaxis: Not indicated    Mobility needs/Fall risk: no    Nutritional Plan: no  Consults:          Labs/Testing due today?: no    Sleep hours: 7.30       Participation in Care/Groups:  selective  Medication Compliant?: Yes  PRNS (last 24 hours): None    Restraints (last 24 hours):  no     CIWA (range last 24 hours):     COWS (range last 24 hours):      Alcohol screening (AUDIT) completed -   AUDIT Score: 0     If applicable, date SBIRT discussed in treatment team AND documented:     Tobacco - patient is a smoker: Have You Used Tobacco in the Past 30 Days: Yes  Illegal Drugs use: Have You Used Any Illegal Substances Over the Past 12 Months: Yes    24 hour chart check complete: yes     Patient goal(s) for today:   Treatment team focus/goals: Plan to titrate her medications. Progress note - She remains pleasant. Stated she feels she is sleeping a lot. She is a little less paranoid today. She will  be seen by [place] [id number] for the tommy funding. Very religiously preoccupied     LOS:  5  Expected LOS: TBD     Financial concerns/prescription coverage:  [facility]   Date of last family contact:  SW tried to call her mother Juan Chaudhari [phone] the mailbox is full and will not accept messages  Phone number for  in the chart is incorrect . Family requesting physician contact today:    Discharge plan: homeless   Guns in the home: no        Outpatient provider(s): Isaac    Participating treatment team members: Chantelle Baldwin, PharmD.

## 2018-05-14 NOTE — PROGRESS NOTES
Problem: Altered Thought Process (Adult/Pediatric)  Goal: *STG: Remains safe in hospital  Outcome: Progressing Towards Goal  Patient has not been out of her room since before 1900. She appears to be taking her medication but needs to be monitored carefully. She is currently sleeping, no stress noted.

## 2018-05-14 NOTE — INTERDISCIPLINARY ROUNDS
Behavioral Health Interdisciplinary Rounds     Patient Name: Harmeet Solano  Age: 41 y.o.   Room/Bed:  732/  Primary Diagnosis: Psychotic disorder   Admission Status: TDO     Readmission within 30 days: no  Power of  in place: no  Patient requires a blocked bed: yes          Reason for blocked bed: Aggressive behavior    VTE Prophylaxis: Not indicated    Mobility needs/Fall risk: no    Nutritional Plan: no  Consults:          Labs/Testing due today?: no    Sleep hours:        Participation in Care/Groups:  selective  Medication Compliant?: Yes  PRNS (last 24 hours): None    Restraints (last 24 hours):  no     CIWA (range last 24 hours):     COWS (range last 24 hours):      Alcohol screening (AUDIT) completed -   AUDIT Score: 0     If applicable, date SBIRT discussed in treatment team AND documented:     Tobacco - patient is a smoker: Have You Used Tobacco in the Past 30 Days: Yes  Illegal Drugs use: Have You Used Any Illegal Substances Over the Past 12 Months: Yes    24 hour chart check complete: yes     Patient goal(s) for today:   Treatment team focus/goals:   Progress note     LOS:  5  Expected LOS:     Financial concerns/prescription coverage:    Date of last family contact:       Family requesting physician contact today:    Discharge plan:   Guns in the home:         Outpatient provider(s):     Participating treatment team members: Harmeet Solano, * (assigned SW),

## 2018-05-14 NOTE — PROGRESS NOTES
Laboratory Monitoring for Antipsychotics: This patient is currently prescribed the following medication(s):   Current Facility-Administered Medications   Medication Dose Route Frequency    OLANZapine (ZyPREXA) tablet 5 mg  5 mg Oral BID    Or    haloperidol lactate (HALDOL) injection 5 mg  5 mg IntraMUSCular BID     The following labs have been completed for monitoring of antipsychotics and/or mood stabilizers:    Height, Weight, BMI Estimation  Estimated body mass index is 19.41 kg/(m^2) as calculated from the following:    Height as of this encounter: 162.6 cm (64\"). Weight as of this encounter: 51.3 kg (113 lb 1.6 oz). Vital Signs/Blood Pressure  Visit Vitals    /71 (BP 1 Location: Right arm, BP Patient Position: At rest;Sitting)    Pulse 64    Temp 98 °F (36.7 °C)    Resp 18    Ht 162.6 cm (64\")    Wt 51.3 kg (113 lb 1.6 oz)    SpO2 98%    Breastfeeding No    BMI 19.41 kg/m2     Renal Function, Hepatic Function and Chemistry  Estimated Creatinine Clearance: 93.1 mL/min (based on Cr of 0.67). Lab Results   Component Value Date/Time    Sodium 141 05/09/2018 01:30 AM    Potassium 4.0 05/09/2018 01:30 AM    Chloride 105 05/09/2018 01:30 AM    CO2 26 05/09/2018 01:30 AM    Anion gap 10 05/09/2018 01:30 AM    BUN 13 05/09/2018 01:30 AM    Creatinine 0.67 05/09/2018 01:30 AM    BUN/Creatinine ratio 19 05/09/2018 01:30 AM    Bilirubin, total 0.3 05/09/2018 01:30 AM    Protein, total 6.8 05/09/2018 01:30 AM    Albumin 3.5 05/09/2018 01:30 AM    Globulin 3.3 05/09/2018 01:30 AM    A-G Ratio 1.1 05/09/2018 01:30 AM    ALT (SGPT) 26 05/09/2018 01:30 AM    Alk.  phosphatase 83 05/09/2018 01:30 AM     Lab Results   Component Value Date/Time    Glucose 98 05/12/2018 05:50 AM     Lab Results   Component Value Date/Time    Hemoglobin A1c 5.8 05/12/2018 05:50 AM     Hematology  Lab Results   Component Value Date/Time    WBC 8.6 05/09/2018 01:30 AM    RBC 4.03 05/09/2018 01:30 AM    HGB 12.7 05/09/2018 01:30 AM    HCT 39.5 05/09/2018 01:30 AM    MCV 98.0 05/09/2018 01:30 AM    MCH 31.5 05/09/2018 01:30 AM    MCHC 32.2 05/09/2018 01:30 AM    RDW 13.0 05/09/2018 01:30 AM    PLATELET 154 58/42/1527 01:30 AM     Lipids  Lab Results   Component Value Date/Time    Cholesterol, total 183 05/12/2018 05:50 AM    HDL Cholesterol 51 05/12/2018 05:50 AM    LDL, calculated 105.4 (H) 05/12/2018 05:50 AM    Triglyceride 133 05/12/2018 05:50 AM    CHOL/HDL Ratio 3.6 05/12/2018 05:50 AM     Thyroid Function  Lab Results   Component Value Date/Time    TSH 0.65 05/09/2018 05:00 AM     Pregnancy Status  Lab Results   Component Value Date/Time    Pregnancy test,urine (POC) NEGATIVE  05/09/2018 03:40 AM     Assessment/Plan:  Recommended baseline laboratory monitoring has been completed based on this patient's current medication regimen. Cannot calculate the patient's ASCVD risk given patient's age is <40; however, given elevated LDL and recent initiation of olanzapine, could consider starting a high-intensity statin. May defer to outpatient provider.      Vidhi Esquivel, PharmD, BCPP, [facility] Specialist, [facility]

## 2018-05-14 NOTE — BH NOTES
GROUP THERAPY PROGRESS NOTE    Doreen Calvo did not participate in a 50 minute Acute Unit's Process Group, with a focus identifying feelings, planning for the rest of the day, and discussing discharge.

## 2018-05-14 NOTE — PROGRESS NOTES
Problem: Altered Thought Process (Adult/Pediatric)  Goal: *STG: Remains safe in hospital  Outcome: Progressing Towards Goal  Patient is sitting quietly in her room. Alert, calm and cooperative. No distress noted.   Patient remains safe in hospital.
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
## 2018-05-14 NOTE — PROGRESS NOTES
Problem: Altered Thought Process (Adult/Pediatric)  Goal: *STG: Participates in treatment plan  Outcome: Progressing Towards Goal  Does not go to group however med and meal compliant. Denies SI. Irritable. Did not state goal. Staff goal: to teach coping skills   Goal: Interventions  Outcome: Progressing Towards Goal  Medication management. Q 15 min safety rounds. Group therapy.

## 2018-05-15 PROCEDURE — 74011250637 HC RX REV CODE- 250/637

## 2018-05-15 PROCEDURE — 65220000003 HC RM SEMIPRIVATE PSYCH

## 2018-05-15 PROCEDURE — 74011250637 HC RX REV CODE- 250/637: Performed by: PSYCHIATRY & NEUROLOGY

## 2018-05-15 RX ORDER — HALOPERIDOL 5 MG/1
5 TABLET ORAL 2 TIMES DAILY
Status: DISCONTINUED | OUTPATIENT
Start: 2018-05-15 | End: 2018-05-16

## 2018-05-15 RX ORDER — HALOPERIDOL 5 MG/1
5 TABLET ORAL 2 TIMES DAILY
Status: DISCONTINUED | OUTPATIENT
Start: 2018-05-15 | End: 2018-05-15

## 2018-05-15 RX ORDER — HALOPERIDOL 5 MG/ML
5 INJECTION INTRAMUSCULAR 2 TIMES DAILY
Status: DISCONTINUED | OUTPATIENT
Start: 2018-05-15 | End: 2018-05-16

## 2018-05-15 RX ORDER — HALOPERIDOL 5 MG/ML
5 INJECTION INTRAMUSCULAR 2 TIMES DAILY
Status: DISCONTINUED | OUTPATIENT
Start: 2018-05-15 | End: 2018-05-15

## 2018-05-15 RX ADMIN — OLANZAPINE 5 MG: 5 TABLET, FILM COATED ORAL at 08:30

## 2018-05-15 RX ADMIN — HALOPERIDOL 5 MG: 5 TABLET ORAL at 20:28
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
## 2018-05-15 NOTE — BH NOTES
GROUP THERAPY PROGRESS NOTE    Doreen Burroughs did not participate in a 40 minute Acute Unit's Process Group, with a focus identifying feelings, planning for the rest of the day, and discussing discharge.

## 2018-05-15 NOTE — INTERDISCIPLINARY ROUNDS
Behavioral Health Interdisciplinary Rounds     Patient Name: Ifeanyi Kendrick  Age: 41 y.o. Room/Bed:  732/02  Primary Diagnosis: Psychotic disorder   Admission Status: Involuntary Commitment, court order meds    Readmission within 30 days: no  Power of  in place: no  Patient requires a blocked bed: yes          Reason for blocked bed: Behavior    VTE Prophylaxis: Not indicated    Mobility needs/Fall risk: no    Nutritional Plan: no  Consults:          Labs/Testing due today?: no    Sleep hours:  8+      Participation in Care/Groups: selective  Medication Compliant?: Yes  PRNS (last 24 hours): None    Restraints (last 24 hours):  no     CIWA (range last 24 hours):     COWS (range last 24 hours):      Alcohol screening (AUDIT) completed -   AUDIT Score: 0     If applicable, date SBIRT discussed in treatment team AND documented:     Tobacco - patient is a smoker: Have You Used Tobacco in the Past 30 Days: Yes  Illegal Drugs use: Have You Used Any Illegal Substances Over the Past 12 Months: Yes    24 hour chart check complete: yes     Patient goal(s) for today:   Treatment team focus/goals: Plan to titrate her medications. Progress note - she remains very religiously prepoccupied and needs direction from nursing staff. LOS:  6  Expected LOS: TBD    Financial concerns/prescription coverage: [employer]   Date of last family contact:  SW spoke with her  today     Family requesting physician contact today:    Discharge plan: homeless   Guns in the home: no      Outpatient provider(s): [address] [place] [address]     Participating treatment team members: Leni White - Dr. Amy Desai - Rianna Davis.

## 2018-05-15 NOTE — PROGRESS NOTES
Problem: Altered Thought Process (Adult/Pediatric)  Goal: *STG: Participates in treatment plan  Outcome: Progressing Towards Goal  Patient denies SI. Med and meal compliant. Isolative. Depressed mood. Patient encouraged to come out of room and she did some. Patient goal: none given. Withdrawn. Staff goal: for patient to take shower and comb her hair. Goal: Interventions  Outcome: Progressing Towards Goal  Medication management. Q 15 min safety rounds.  Group therapy

## 2018-05-15 NOTE — PROGRESS NOTES
Problem: Altered Thought Process (Adult/Pediatric)  Goal: *STG: Complies with medication therapy  Outcome: Progressing Towards Goal  Patient is alert, calm and cooperative. Resting quietly in bed. No distress noted. Will continue to monitor.

## 2018-05-15 NOTE — PROGRESS NOTES
05/15/18 0732   Vital Signs   Temp 98.2 °F (36.8 °C)   Temp Source Oral   Pulse (Heart Rate) (!) 101   Resp Rate 18   O2 Sat (%) 94 %   Level of Consciousness Alert   BP 97/67   MAP (Calculated) 77   MEWS Score 3   will review with Dr. Lattie Halsted in treatment team

## 2018-05-16 PROCEDURE — 74011250637 HC RX REV CODE- 250/637: Performed by: PSYCHIATRY & NEUROLOGY

## 2018-05-16 PROCEDURE — 65220000003 HC RM SEMIPRIVATE PSYCH

## 2018-05-16 RX ORDER — HALOPERIDOL 5 MG/ML
5 INJECTION INTRAMUSCULAR 2 TIMES DAILY
Status: DISCONTINUED | OUTPATIENT
Start: 2018-05-16 | End: 2018-05-17

## 2018-05-16 RX ORDER — HALOPERIDOL 5 MG/1
7.5 TABLET ORAL 2 TIMES DAILY
Status: DISCONTINUED | OUTPATIENT
Start: 2018-05-16 | End: 2018-05-17

## 2018-05-16 RX ADMIN — HALOPERIDOL 7.5 MG: 5 TABLET ORAL at 21:19

## 2018-05-16 RX ADMIN — HALOPERIDOL 5 MG: 5 TABLET ORAL at 08:19
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
## 2018-05-16 NOTE — PROGRESS NOTES
[facility]  Master Treatment Plan Doreen Coats        Date Treatment Plan Initiated: 5/09/2018      Treatment Plan Modalities:    Type of Modality Amount  (x minutes) Frequency (x/week) Duration (x days) Name of Responsible Staff   Community & wrap-up meetings to encourage peer interactions    15    7    1     IVETH Sullivan   Group psychotherapy to assist in building coping skills and internal controls    60    7    1    Xander Stephens LCSW   Therapeutic activity groups to build coping skills    60    7    1    Xander Stephens LCSW   Psychoeducation in group setting to address:   Medication education    15    7    1    GERALD Calle   Coping skills    20    7    1    Rianna RN   Relaxation techniques           Symptom management           Discharge planning    15    7    1    Renee    Spirituality     60    7    1    jim COTE    60    7    1    Volunteer from [employer]    61    7    1    Volunteer from [address] [place] [address] medication management    15    7    1    Dr. Wilmar Ruiz   Family meeting/discharge planning                                 Problem: Altered Thought Process (Adult/Pediatric)  Goal: *STG: Participates in treatment plan  Outcome: Progressing Towards Goal  Meets with treatment team, isolative at times, denies SI.    Goal: *STG: Complies with medication therapy  Outcome: Progressing Towards Goal  Medication compliant  Goal: *STG: Attends activities and groups  Outcome: Progressing Towards Goal  Attends groups

## 2018-05-16 NOTE — BH NOTES
GROUP THERAPY PROGRESS NOTE    Doreen Quinones is participating in Reflections. Group time: 10 minutes    Personal goal for participation: unit orientation and daily progress    Goal orientation: personal    Group therapy participation: passive    Therapeutic interventions reviewed and discussed: yes    Impression of participation: Seems in fair spirits. Has been complaining of not getting what she had ordered on meal trays. Staff assisted with filling out menu.

## 2018-05-16 NOTE — BH NOTES
GROUP THERAPY PROGRESS NOTE    Doreen Uribe participated in the Acute Unit's afternoon Process Group for yesterday, with a focus on identifying feelings, planning for the rest of the day, and preparing for discharge. Group time: 40 minutes. Personal goal for participation: To increase the capacity to improve ones mood and structure. Goal orientation: The patient will be able to identify their feelings, develop a plan for structuring their day, and discharge planning. Group therapy participation: With prompting, this patient partially and mostly passively participated in the group. Therapeutic interventions reviewed and discussed: The group members were asked to introduce themselves to each other and to see if they could identify an emotion they are having and/or let the group know what they want to focus on for the day as they continue to make discharge plans. Impression of participation: The patient said she has been \"resting\" and that she was looking forward to possibly getting a shower this afternoon. She also said she has been doing a fair amount of coloring. She was encouraged to think about what she might need in order to keep from having to come back into the hospital. She listened to her peers but was mostly quiet. She expressed no current SI/HI. She also displayed no overt psychotic symptoms in this group, but this could not be validated in this group because of her limited participation. Her affect was anxious and she had trouble remaining sitting with the group. She got up and seemed to go back and forth to her room. Her mood matched her affect.

## 2018-05-16 NOTE — INTERDISCIPLINARY ROUNDS
Behavioral Health Interdisciplinary Rounds     Patient Name: Dhaval Fitzgerald  Age: 41 y.o.   Room/Bed:  732/02  Primary Diagnosis: Psychotic disorder   Admission Status: Involuntary Commitment and Forced Medication Order     Readmission within 30 days: no  Power of  in place: no  Patient requires a blocked bed: yes          Reason for blocked bed: Behavior    VTE Prophylaxis: Not indicated    Mobility needs/Fall risk: no    Nutritional Plan: no  Consults:          Labs/Testing due today?: no    Sleep hours:  6.45      Participation in Care/Groups:  yes  Medication Compliant?: Yes  PRNS (last 24 hours): None    Restraints (last 24 hours):  no     CIWA (range last 24 hours):     COWS (range last 24 hours):      Alcohol screening (AUDIT) completed -   AUDIT Score: 0     If applicable, date SBIRT discussed in treatment team AND documented:     Tobacco - patient is a smoker: Have You Used Tobacco in the Past 30 Days: Yes  Illegal Drugs use: Have You Used Any Illegal Substances Over the Past 12 Months: Yes    24 hour chart check complete: yes     Patient goal(s) for today:   Treatment team focus/goals:   Progress note     LOS:  7  Expected LOS: TBD     Financial concerns/prescription coverage: [employer]    Date of last family contact:CHRIS will call her       Family requesting physician contact today:    Discharge plan: she will return home when ready for discharge   Guns in the home: no       Outpatient provider(s): refer to [phone]     Participating treatment team members: CHRIS Brown - Dr. Charl Josephs - Caldwell Lesch

## 2018-05-17 PROCEDURE — 74011250637 HC RX REV CODE- 250/637

## 2018-05-17 PROCEDURE — 65220000003 HC RM SEMIPRIVATE PSYCH

## 2018-05-17 PROCEDURE — 74011250637 HC RX REV CODE- 250/637: Performed by: PSYCHIATRY & NEUROLOGY

## 2018-05-17 RX ORDER — HALOPERIDOL 10 MG/1
10 TABLET ORAL 2 TIMES DAILY
Qty: 30 TAB | Refills: 1 | Status: SHIPPED | OUTPATIENT
Start: 2018-05-17

## 2018-05-17 RX ORDER — HALOPERIDOL 5 MG/ML
5 INJECTION INTRAMUSCULAR 2 TIMES DAILY
Status: DISCONTINUED | OUTPATIENT
Start: 2018-05-17 | End: 2018-05-18 | Stop reason: HOSPADM

## 2018-05-17 RX ORDER — HALOPERIDOL 10 MG/1
10 TABLET ORAL 2 TIMES DAILY
Status: DISCONTINUED | OUTPATIENT
Start: 2018-05-17 | End: 2018-05-18 | Stop reason: HOSPADM

## 2018-05-17 RX ADMIN — HALOPERIDOL 7.5 MG: 5 TABLET ORAL at 08:08

## 2018-05-17 RX ADMIN — HALOPERIDOL 10 MG: 10 TABLET ORAL at 20:22

## 2018-05-17 NOTE — BH NOTES
GROUP THERAPY PROGRESS NOTE    The patient Doreen Quinones is participating in [place]. Group time: 30 minutes    Personal goal for participation: to orient the patient to the unit.     Goal orientation: successful adoption of unit rules    Group therapy participation: active    Therapeutic interventions reviewed and discussed: Yes    Impression of participation:     Srini Stone  5/17/2018 10:22 AM

## 2018-05-17 NOTE — PROGRESS NOTES
Problem: Altered Thought Process (Adult/Pediatric)  Goal: *STG: Seeks staff when feelings of anxiety and fear arise  Outcome: Progressing Towards Goal  Pt able to communicate needs with staff. Currently focused on discharge at this time. Goal: *STG: Complies with medication therapy  Outcome: Progressing Towards Goal  Took po scheduled medications as ordered this am.   Goal: *STG: Attends activities and groups  Outcome: Progressing Towards Goal  Encouraged pt to attend groups and express feelings and concerns.

## 2018-05-17 NOTE — PROGRESS NOTES
Problem: Altered Thought Process (Adult/Pediatric)  Goal: *STG: Complies with medication therapy  Outcome: Progressing Towards Goal  Pt isolative to room this evening. Out for meals and meds. No acute distress noted at this time. Staff will continue to monitor q 15 min checks.

## 2018-05-17 NOTE — INTERDISCIPLINARY ROUNDS
Behavioral Health Interdisciplinary Rounds     Patient Name: Dannielle Pemberton  Age: 41 y.o. Room/Bed:  732/02  Primary Diagnosis: Psychotic disorder   Admission Status: Involuntary Commitment and Forced Medication Order     Readmission within 30 days: no  Power of  in place: no  Patient requires a blocked bed: no          Reason for blocked bed:     VTE Prophylaxis: Not indicated    Mobility needs/Fall risk: no    Nutritional Plan: no  Consults:          Labs/Testing due today?: no    Sleep hours:  8      Participation in Care/Groups:  yes  Medication Compliant?: Yes  PRNS (last 24 hours): None    Restraints (last 24 hours):  no     CIWA (range last 24 hours):     COWS (range last 24 hours):      Alcohol screening (AUDIT) completed -   AUDIT Score: 0     If applicable, date SBIRT discussed in treatment team AND documented:     Tobacco - patient is a smoker: Have You Used Tobacco in the Past 30 Days: Yes  Illegal Drugs use: Have You Used Any Illegal Substances Over the Past 12 Months: Yes    24 hour chart check complete: yes     Patient goal(s) for today:   Treatment team focus/goals: Plan to set up for discharge on Friday. Progress note - she has been pleasant and complaint with her medications and treatment. Wanting to go home.       LOS:  8  Expected LOS: TBD    Financial concerns/prescription coverage: [employer]   Date of last family contact:SW spoke to her  yesterday       Family requesting physician contact today:    Discharge plan: she will return home with her    Guns in the home: no     Outpatient provider(s): [phone]     Participating treatment team members: Doreen Ferris RN

## 2018-05-17 NOTE — DISCHARGE INSTRUCTIONS
DISCHARGE SUMMARY    NAME:Doreen Brian Guardian  : 1980  MRN: [id number]    The patient Doreen Briggs exhibits the ability to control behavior in a less restrictive environment. Patient's level of functioning is improving. No assaultive/destructive behavior has been observed for the past 24 hours. No suicidal/homicidal threat or behavior has been observed for the past 24 hours. There is no evidence of serious medication side effects. Patient has not been in physical or protective restraints for at least the past 24 hours. If weapons involved, how are they secured? No weapons involved     Is patient aware of and in agreement with discharge plan? Patient is aware of discharge and is in agreement     Arrangements for medication:  Prescriptions filled at [facility]      Referral for substance abuse treatment ? Yes, referred to [facility]   Referral for smoking cessation needed ? Yes, refused     Copy of discharge instructions to  provider?:  Yes , fax to [phone]    Arrangements for transportation home:  Taxi to [address] [place] [address] all follow up appointments as scheduled, continue to take prescribed medications per physician instructions. Mental health crisis number:  [phone] or your local mental health crisis line number at [phone] [facility] from Nurse    PATIENT INSTRUCTIONS:  What to do at Home:  Recommended activity: Activity as tolerated. If you experience any of the following symptoms hopeless helpless overwhelming thoughts of harming of harming self or others, uncontrolled racing thoughts or paranoid thoughts, please call 911 or your local mental health crisis line number at [phone]. *  Please give a list of your current medications to your Primary Care Provider. *  Please update this list whenever your medications are discontinued, doses are      changed, or new medications (including over-the-counter products) are added.     *  Please carry medication information at all times in case of emergency situations. These are general instructions for a healthy lifestyle:    No smoking/ No tobacco products/ Avoid exposure to second hand smoke  Surgeon General's Warning:  Quitting smoking now greatly reduces serious risk to your health. Obesity, smoking, and sedentary lifestyle greatly increases your risk for illness    A healthy diet, regular physical exercise & weight monitoring are important for maintaining a healthy lifestyle    You may be retaining fluid if you have a history of heart failure or if you experience any of the following symptoms:  Weight gain of 3 pounds or more overnight or 5 pounds in a week, increased swelling in our hands or feet or shortness of breath while lying flat in bed. Please call your doctor as soon as you notice any of these symptoms; do not wait until your next office visit. Recognize signs and symptoms of STROKE:    F-face looks uneven    A-arms unable to move or move unevenly    S-speech slurred or non-existent    T-time-call 911 as soon as signs and symptoms begin-DO NOT go       Back to bed or wait to see if you get better-TIME IS BRAIN. Warning Signs of HEART ATTACK     Call 911 if you have these symptoms:   Chest discomfort. Most heart attacks involve discomfort in the center of the chest that lasts more than a few minutes, or that goes away and comes back. It can feel like uncomfortable pressure, squeezing, fullness, or pain.  Discomfort in other areas of the upper body. Symptoms can include pain or discomfort in one or both arms, the back, neck, jaw, or stomach.  Shortness of breath with or without chest discomfort.  Other signs may include breaking out in a cold sweat, nausea, or lightheadedness. Don't wait more than five minutes to call 911 - MINUTES MATTER! Fast action can save your life. Calling 911 is almost always the fastest way to get lifesaving treatment.  Emergency Medical Services staff can begin treatment when they arrive -- up to an hour sooner than if someone gets to the hospital by car. The discharge information has been reviewed with the patient. The patient verbalized understanding. Discharge medications reviewed with the patient and appropriate educational materials and side effects teaching were provided.   ___________________________________________________________________________________________________________________________________

## 2018-05-17 NOTE — PROGRESS NOTES
Problem: Falls - Risk of  Goal: *Absence of Falls  Document Seun Fall Risk and appropriate interventions in the flowsheet. Outcome: Progressing Towards Goal  Fall Risk Interventions:     Non slip socks  Bed in low position       Medication Interventions: Teach patient to arise slowly                  Problem: Altered Thought Process (Adult/Pediatric)  Goal: *STG: Complies with medication therapy  Outcome: Progressing Towards Goal  Medication compliant     Goal: *STG: Decreased delusional thinking  Outcome: Progressing Towards Goal  Pt. Out on unit for meals  Minimally socializing with peers but no overt delusional thinking exhibited    Less religiously preoccupied    Goal: Interventions  Outcome: Progressing Towards Goal  Will continue to monitor   On 15 min.  Checks for safety  Assess thought process   Medication compliance  Effectiveness  Encourage group  participation

## 2018-05-17 NOTE — PROGRESS NOTES
Problem: Falls - Risk of  Goal: *Absence of Falls  Document Seun Fall Risk and appropriate interventions in the flowsheet.    Outcome: Progressing Towards Goal  Fall Risk Interventions:            Medication Interventions: Teach patient to arise slowly

## 2018-05-18 VITALS
OXYGEN SATURATION: 99 % | WEIGHT: 113.1 LBS | HEART RATE: 92 BPM | TEMPERATURE: 98.1 F | HEIGHT: 64 IN | DIASTOLIC BLOOD PRESSURE: 72 MMHG | BODY MASS INDEX: 19.31 KG/M2 | SYSTOLIC BLOOD PRESSURE: 103 MMHG | RESPIRATION RATE: 16 BRPM

## 2018-05-18 PROCEDURE — 74011250637 HC RX REV CODE- 250/637: Performed by: PSYCHIATRY & NEUROLOGY

## 2018-05-18 PROCEDURE — 74011250637 HC RX REV CODE- 250/637

## 2018-05-18 RX ADMIN — HALOPERIDOL 10 MG: 10 TABLET ORAL at 08:51

## 2018-05-18 NOTE — PROGRESS NOTES
Problem: Falls - Risk of  Goal: *Absence of Falls  Document Seun Fall Risk and appropriate interventions in the flowsheet. Outcome: Progressing Towards Goal  Fall Risk Interventions:            Medication Interventions: Teach patient to arise slowly     Pt is quiet and sleeping  No falls noted since arriving on shift   No signs of distress noted   Will continue to monitor.

## 2018-05-18 NOTE — INTERDISCIPLINARY ROUNDS
Behavioral Health Interdisciplinary Rounds     Patient Name: Batool Pedersen  Age: 41 y.o.   Room/Bed:  [facility]/  Primary Diagnosis: Psychotic disorder   Admission Status: Involuntary Commitment and Forced Medication Order     Readmission within 30 days: no  Power of  in place: no  Patient requires a blocked bed: no          Reason for blocked bed:     VTE Prophylaxis: Not indicated    Mobility needs/Fall risk: no    Nutritional Plan: no  Consults:          Labs/Testing due today?: no    Sleep hours: 6.45       Participation in Care/Groups:  No   Medication Compliant?: Yes  PRNS (last 24 hours): None    Restraints (last 24 hours):  no     CIWA (range last 24 hours):     COWS (range last 24 hours):      Alcohol screening (AUDIT) completed -   AUDIT Score: 0     If applicable, date SBIRT discussed in treatment team AND documented:     Tobacco - patient is a smoker: Have You Used Tobacco in the Past 30 Days: Yes  Illegal Drugs use: Have You Used Any Illegal Substances Over the Past 12 Months: Yes    24 hour chart check complete: yes     Patient goal(s) for today:   Treatment team focus/goals: Plan is for discharge on Friday   Progress note     LOS:  9  Expected LOS:     Financial concerns/prescription coverage:  no  Date of last family contact:       Family requesting physician contact today:  no  Discharge plan:   Guns in the home:       Outpatient provider(s):     Participating treatment team members: Batool Pedersen, * (assigned SW),

## 2018-05-18 NOTE — BH NOTES
Behavioral Health Transition Record to Provider    Patient Name: Marlen Wong  YOB: 1980  Medical Record Number: [id number]  Date of Admission: 5/9/2018  Date of Discharge: 5/18/2018     Attending Provider: Joan Ruffin MD  Discharging Provider: Dr. Deja Thayer   To contact this individual call [phone] and ask the  to page. If unavailable, ask to be transferred to [address] [place] [address] Provider on call. [facility] Provider will be available on call 24/7 and during holidays. Primary Care Provider: None    Allergies   Allergen Reactions    Latex Rash    Codeine Rash       Reason for Admission: \"I don't belong here\". Pt admitted on an involuntary basis for bizarre behavior and psychosis posing risk of harm to herself and others.     Admission Diagnosis: Psychotic disorder    * No surgery found *    Results for orders placed or performed during the hospital encounter of 05/09/18   CULTURE, URINE   Result Value Ref Range    Special Requests: NO SPECIAL REQUESTS      [place] Count >100,000  COLONIES/mL        Culture result: ESCHERICHIA COLI (A)      Culture result: ENTEROBACTER CLOACAE (A)         Susceptibility    Enterobacter cloacae - NIKKIE     Amikacin ($) <=16 Susceptible ug/mL     Aztreonam ($$$$) <=4 Susceptible ug/mL     Cefepime ($$) <=4 Susceptible ug/mL     Cefotaxime <=2 Susceptible ug/mL     Ceftazidime ($) <=1 Susceptible ug/mL     Ceftriaxone ($) <=1 Susceptible ug/mL     Ciprofloxacin ($) <=1 Susceptible ug/mL     Gentamicin ($) <=4 Susceptible ug/mL     Imipenem <=1 Susceptible ug/mL     Levofloxacin ($) <=2 Susceptible ug/mL     Meropenem ($$) <=1 Susceptible ug/mL     Nitrofurantoin >64 Resistant ug/mL     Piperacillin/Tazobac ($) <=16 Susceptible ug/mL     Tobramycin ($) <=4 Susceptible ug/mL     Trimeth/Sulfa <=2/38 Susceptible ug/mL    Escherichia coli - NIKKIE     Amikacin ($) <=16 Susceptible ug/mL     Ampicillin ($) >16 Resistant ug/mL     Ampicillin/sulbactam ($) 16/8 Intermediate ug/mL     Aztreonam ($$$$) <=4 Susceptible ug/mL     Cefazolin ($) <=8 Susceptible ug/mL     Cefepime ($$) <=4 Susceptible ug/mL     Cefotaxime <=2 Susceptible ug/mL     Ceftazidime ($) <=1 Susceptible ug/mL     Ceftriaxone ($) <=1 Susceptible ug/mL     Cefuroxime ($) <=4 Susceptible ug/mL     Ciprofloxacin ($) <=1 Susceptible ug/mL     Gentamicin ($) <=4 Susceptible ug/mL     Imipenem <=1 Susceptible ug/mL     Levofloxacin ($) <=2 Susceptible ug/mL     Meropenem ($$) <=1 Susceptible ug/mL     Nitrofurantoin <=32 Susceptible ug/mL     Piperacillin/Tazobac ($) <=16 Susceptible ug/mL     Tobramycin ($) <=4 Susceptible ug/mL     Trimeth/Sulfa >2/38 Resistant ug/mL   TSH 3RD GENERATION   Result Value Ref Range    TSH 0.65 0.36 - 3.74 uIU/mL   CBC WITH AUTOMATED DIFF   Result Value Ref Range    WBC 8.6 3.6 - 11.0 K/uL    RBC 4.03 3.80 - 5.20 M/uL    HGB 12.7 11.5 - 16.0 g/dL    HCT 39.5 35.0 - 47.0 %    MCV 98.0 80.0 - 99.0 FL    MCH 31.5 26.0 - 34.0 PG    MCHC 32.2 30.0 - 36.5 g/dL    RDW 13.0 11.5 - 14.5 %    PLATELET 593 176 - 193 K/uL    MPV 9.2 8.9 - 12.9 FL    NRBC 0.0 0  WBC    ABSOLUTE NRBC 0.00 0.00 - 0.01 K/uL    NEUTROPHILS 58 32 - 75 %    LYMPHOCYTES 32 12 - 49 %    MONOCYTES 6 5 - 13 %    EOSINOPHILS 4 0 - 7 %    BASOPHILS 1 0 - 1 %    IMMATURE GRANULOCYTES 0 0.0 - 0.5 %    ABS. NEUTROPHILS 5.0 1.8 - 8.0 K/UL    ABS. LYMPHOCYTES 2.7 0.8 - 3.5 K/UL    ABS. MONOCYTES 0.5 0.0 - 1.0 K/UL    ABS. EOSINOPHILS 0.3 0.0 - 0.4 K/UL    ABS. BASOPHILS 0.0 0.0 - 0.1 K/UL    ABS. IMM.  GRANS. 0.0 0.00 - 0.04 K/UL    DF AUTOMATED     METABOLIC PANEL, COMPREHENSIVE   Result Value Ref Range    Sodium 141 136 - 145 mmol/L    Potassium 4.0 3.5 - 5.1 mmol/L    Chloride 105 97 - 108 mmol/L    CO2 26 21 - 32 mmol/L    Anion gap 10 5 - 15 mmol/L    Glucose 75 65 - 100 mg/dL    BUN 13 6 - 20 MG/DL    Creatinine 0.67 0.55 - 1.02 MG/DL    BUN/Creatinine ratio 19 12 - 20      GFR est AA >60 >60 ml/min/1.73m2 GFR est non-AA >60 >60 ml/min/1.73m2    Calcium 8.7 8.5 - 10.1 MG/DL    Bilirubin, total 0.3 0.2 - 1.0 MG/DL    ALT (SGPT) 26 12 - 78 U/L    AST (SGOT) 15 15 - 37 U/L    Alk.  phosphatase 83 45 - 117 U/L    Protein, total 6.8 6.4 - 8.2 g/dL    Albumin 3.5 3.5 - 5.0 g/dL    Globulin 3.3 2.0 - 4.0 g/dL    A-G Ratio 1.1 1.1 - 2.2     ACETAMINOPHEN   Result Value Ref Range    Acetaminophen level <2 (L) 10 - 30 ug/mL   ETHYL ALCOHOL   Result Value Ref Range    ALCOHOL(ETHYL),SERUM <88 <13 MG/DL   SALICYLATE   Result Value Ref Range    Salicylate level <6.3 (L) 2.8 - 20.0 MG/DL   URINALYSIS W/MICROSCOPIC   Result Value Ref Range    Color YELLOW/STRAW      Appearance TURBID (A) CLEAR      Specific gravity 1.021 1.003 - 1.030      pH (UA) 6.5 5.0 - 8.0      Protein NEGATIVE  NEG mg/dL    Glucose NEGATIVE  NEG mg/dL    Ketone NEGATIVE  NEG mg/dL    Bilirubin NEGATIVE  NEG      Blood NEGATIVE  NEG      Urobilinogen 1.0 0.2 - 1.0 EU/dL    Nitrites NEGATIVE  NEG      Leukocyte Esterase LARGE (A) NEG      WBC >100 (H) 0 - 4 /hpf    RBC 0-5 0 - 5 /hpf    Epithelial cells FEW FEW /lpf    Bacteria 3+ (A) NEG /hpf    CA Oxalate crystals FEW (A) NEG     LIPID PANEL   Result Value Ref Range    LIPID PROFILE          Cholesterol, total 183 <200 MG/DL    Triglyceride 133 <150 MG/DL    HDL Cholesterol 51 MG/DL    LDL, calculated 105.4 (H) 0 - 100 MG/DL    VLDL, calculated 26.6 MG/DL    CHOL/HDL Ratio 3.6 0 - 5.0     GLUCOSE, FASTING   Result Value Ref Range    Glucose 98 65 - 100 MG/DL   HEMOGLOBIN A1C WITH EAG   Result Value Ref Range    Hemoglobin A1c 5.8 4.2 - 6.3 %    Est. average glucose 120 mg/dL   HCG URINE, QL. - POC   Result Value Ref Range    Pregnancy test,urine (POC) NEGATIVE  NEG         Immunizations administered during this encounter:   Immunization History   Administered Date(s) Administered    Influenza Vaccine (Quad) PF 01/04/2017    Tdap 10/13/2015       Screening for Metabolic Disorders for Patients on Antipsychotic Medications  (Data obtained from the EMR)    Estimated Body Mass Index  Estimated body mass index is 19.41 kg/(m^2) as calculated from the following:    Height as of this encounter: 5' 4\" (1.626 m). Weight as of this encounter: 51.3 kg (113 lb 1.6 oz). Vital Signs/Blood Pressure  Visit Vitals    /72    Pulse 92    Temp 98.1 °F (36.7 °C)    Resp 16    Ht 5' 4\" (1.626 m)    Wt 51.3 kg (113 lb 1.6 oz)    SpO2 99%    Breastfeeding No    BMI 19.41 kg/m2       Blood Glucose/Hemoglobin A1c  Lab Results   Component Value Date/Time    Glucose 98 05/12/2018 05:50 AM       Lab Results   Component Value Date/Time    Hemoglobin A1c 5.8 05/12/2018 05:50 AM        Lipid Panel  Lab Results   Component Value Date/Time    Cholesterol, total 183 05/12/2018 05:50 AM    HDL Cholesterol 51 05/12/2018 05:50 AM    LDL, calculated 105.4 (H) 05/12/2018 05:50 AM    Triglyceride 133 05/12/2018 05:50 AM    CHOL/HDL Ratio 3.6 05/12/2018 05:50 AM        Discharge Diagnosis: Psychotic Disorder     Discharge Plan: She will be discharge to go to [place] [address] to open her case. She will return home with family after she finishes at [facility]. The patient Doreen Cherry exhibits the ability to control behavior in a less restrictive environment. Patient's level of functioning is improving. No assaultive/destructive behavior has been observed for the past 24 hours. No suicidal/homicidal threat or behavior has been observed for the past 24 hours. There is no evidence of serious medication side effects. Patient has not been in physical or protective restraints for at least the past 24 hours. If weapons involved, how are they secured? No weapons involved     Is patient aware of and in agreement with discharge plan? Patient is aware of discharge and is in agreement     Arrangements for medication:  Prescriptions filled at [facility] for substance abuse treatment ?  Yes, referred to [facility]   Referral for smoking cessation needed ? Yes, refused     Copy of discharge instructions to  provider?:  Yes , fax to [phone]    Arrangements for transportation home:  Taxi to [address] [place] [address] all follow up appointments as scheduled, continue to take prescribed medications per physician instructions. Mental health crisis number:  [phone] or your local mental health crisis line number at [phone]              Discharge Medication List and Instructions:   Current Discharge Medication List      START taking these medications    Details   haloperidol (HALDOL) 10 mg tablet Take 1 Tab by mouth two (2) times a day. Indications: Psychotic Disorder  Qty: 30 Tab, Refills: 1             Unresulted Labs     None        To obtain results of studies pending at discharge, please contact [phone]    Follow-up Information     Follow up With Details Comments [facility] on 5/18/2018 plan to go to [place] to complete assessment and open your case  [id number]  [phone]    None   None (395) Patient stated that they have no PCP            Advanced Directive:   Does the patient have an appointed surrogate decision maker? No  Does the patient have a Medical Advance Directive? No  Does the patient have a Psychiatric Advance Directive? No  If the patient does not have a surrogate or Medical Advance Directive AND Psychiatric Advance Directive, the patient was offered information on these advance directives Patient declined to complete    Patient Instructions: Please continue all medications until otherwise directed by physician. Tobacco Cessation Discharge Plan:   Is the patient a smoker and needs referral for smoking cessation? Yes  Patient referred to the following for smoking cessation with an appointment? Refused     Patient was offered medication to assist with smoking cessation at discharge?  Refused  Was education for smoking cessation added to the discharge instructions? Yes    Alcohol/Substance Abuse Discharge Plan:   Does the patient have a history of substance/alcohol abuse and requires a referral for treatment? Yes  Patient referred to the following for substance/alcohol abuse treatment with an appointment? Yes- 5/18/2018 at 12:00 to [facility]   Patient was offered medication to assist with alcohol cessation at discharge? No  Was education for substance/alcohol abuse added to discharge instructions? No  Patient discharged to Home; discussed with patient/caregiver and provided to the patient/caregiver either in hard copy or electronically.

## 2018-05-18 NOTE — BH NOTES
GROUP THERAPY PROGRESS NOTE    Doreen Ayala is participating in [place].      Group time: 15 minutes    Personal goal for participation: prepare for discharge    Goal orientation: community    Group therapy participation: active    Therapeutic interventions reviewed and discussed: yes    Impression of participation: engaged
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
## 2018-05-18 NOTE — PROGRESS NOTES
Problem: Altered Thought Process (Adult/Pediatric)  Goal: *STG: Participates in treatment plan  Outcome: Progressing Towards Goal  Plan to meet with treatment team today discuss discharge. Goal: *STG: Complies with medication therapy  Outcome: Progressing Towards Goal  Education provided. Pt is medication compliant. Goal: *STG: Attends activities and groups  Outcome: Progressing Towards Goal  Pt encouraged to attend groups and activities. Plan: discharge today follow up with out pt today. Prescription provided. Leave via Taxi. Pt received discharge instructions follow up information, prescription, crisis plan reviewed. Pt verbalizes understanding of information provided. Opportunity for questions and clarification provided. Pt discharging with personal belongings. Shoe provided to pt.

## 2018-05-18 NOTE — INTERDISCIPLINARY ROUNDS
Behavioral Health Interdisciplinary Rounds     Patient Name: Jeannine Ly  Age: 41 y.o.   Room/Bed:  2/  Primary Diagnosis: Psychotic disorder   Admission Status: Involuntary Commitment and Forced Medication Order     Readmission within 30 days: no  Power of  in place: no  Patient requires a blocked bed: no          Reason for blocked bed:     VTE Prophylaxis: Not indicated    Mobility needs/Fall risk: no    Nutritional Plan: no  Consults:          Labs/Testing due today?: no    Sleep hours:        Participation in Care/Groups:  No   Medication Compliant?: Yes  PRNS (last 24 hours): None    Restraints (last 24 hours):  no     CIWA (range last 24 hours):     COWS (range last 24 hours):      Alcohol screening (AUDIT) completed -   AUDIT Score: 0     If applicable, date SBIRT discussed in treatment team AND documented:     Tobacco - patient is a smoker: Have You Used Tobacco in the Past 30 Days: Yes  Illegal Drugs use: Have You Used Any Illegal Substances Over the Past 12 Months: Yes    24 hour chart check complete: yes     Patient goal(s) for today:   Treatment team focus/goals: Plan is for discharge on Friday   Progress note     LOS:  9  Expected LOS:     Financial concerns/prescription coverage:  no  Date of last family contact:       Family requesting physician contact today:  no  Discharge plan:   Guns in the home:       Outpatient provider(s):     Participating treatment team members: Jeannine Ly, * (assigned SW),
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
## 2018-06-04 NOTE — DISCHARGE SUMMARY
PSYCHIATRIC DISCHARGE SUMMARY         IDENTIFICATION:    Patient Name  Vaishnavi Elias   Date of Birth 1980   [facility] [id number]   Medical Record Number  [id number]      Age  41 y.o. PCP None   Admit date:  5/9/2018    Discharge date: 6/4/2018   Room Number  732/02  @ [facility]   Date of Service  6/4/2018            TYPE OF DISCHARGE: REGULAR               CONDITION AT DISCHARGE: improved       PROVISIONAL & DISCHARGE DIAGNOSES:    Problem List  Date Reviewed: 11/18/2015          Codes Class    * (Principal)Psychotic disorder ICD-10-CM: F29  ICD-9-CM: 298.9               Active Hospital Problems    *Psychotic disorder    Amphetamine dependence  Amphetamine induced psychosis    DISCHARGE DIAGNOSIS:   Axis I:  SEE ABOVE  Axis II: SEE ABOVE  Axis III: SEE ABOVE  Axis IV: unstable supports, housing, etc.  Axis V:  15 on admission, 55 on discharge 65(baseline)       CC & HISTORY OF PRESENT ILLNESS:  The patient, Doreen Ledezma, is a 41 y.o. WHITE [place]  female with a past psychiatric history significant for methamphetamine dependence, past psychiatric hospitalizations for psychosis, who presents at this time with complaints of (and/or evidence of) the following emotional symptoms: she was found unresponsive under a car. .  Additional symptomatology include  called 911 due to odd behavior including unpacking moving boxes, throwing items all over the house, pouring trash in the bedrooms, putting metal cans in the microwave. She was very disorganized, preoccupied with witchraft and Shinto. She denies any psychiatric problems or need for mental health treatment. Reported history of meth usage and uds positive for amphetamines. The above symptoms have been present for a couple of days. These symptoms are of moderate to high severity. These symptoms are very constant  in nature.        SOCIAL HISTORY:    Social History     Social History    Marital status:      Spouse name: N/A    Number of children: N/A    Years of education: N/A     Occupational History    Not on file. Social History Main Topics    Smoking status: Current Every Day Smoker     Packs/day: 1.00     Years: 15.00    Smokeless tobacco: Never Used    Alcohol use Yes    Drug use: Yes     Special: Methamphetamines      Comment: Patient denies    Sexual activity: Yes     Partners: Male     Birth control/ protection: None     Other Topics Concern    Not on file     Social History Narrative      FAMILY HISTORY:   History reviewed. No pertinent family history. HOSPITALIZATION COURSE:    Doreen Burroughs was admitted to the inpatient psychiatric unit [facility] for acute psychiatric stabilization in regards to symptomatology as described in the HPI above. The differential diagnosis at time of admission included: methamphetamine induced psychosis vs. szp vs. Schizoaffective disorder vs. Psychosis nos. While on the unit Doreen Burroughs was involved in individual, group, occupational and milieu therapy. Psychiatric medications were adjusted during this hospitalization including (see below). Doreen Burroughs demonstrated a slow, but progressive improvement in overall condition. Much of patient's psychosis appeared to be related to situational stressors, effects of drugs of abuse, and psychological factors. Please see individual progress notes for more specific details regarding patient's hospitalization course. At time of discharge, Melody Eldonna Sandifer is without significant problems of psychosis. Patient free of suicidal and homicidal ideations (appears to be at very low risk of suicide or homicide) and reports many positive predictive factors in terms of not attempting suicide or homicide. Overall presentation at time of discharge is most consistent with the diagnosis of amphetamine induced psychosis. Patient with request for discharge today. There are no grounds to seek a TDO.  Patient has maximized benefit to be derived from acute inpatient psychiatric treatment. All members of the treatment team concur with each other in regards to plans for discharge today per patient's request.  Patient and family are aware and in agreement with discharge and discharge plan.              LABS AND IMAGING:    Labs Reviewed   CULTURE, URINE - Abnormal; Notable for the following:        Result Value    Culture result: ESCHERICHIA COLI (*)     Culture result: ENTEROBACTER CLOACAE (*)     All other components within normal limits   ACETAMINOPHEN - Abnormal; Notable for the following:     Acetaminophen level <2 (*)     All other components within normal limits   SALICYLATE - Abnormal; Notable for the following:     Salicylate level <1.5 (*)     All other components within normal limits   URINALYSIS W/MICROSCOPIC - Abnormal; Notable for the following:     Appearance TURBID (*)     Leukocyte Esterase LARGE (*)     WBC >100 (*)     Bacteria 3+ (*)     CA Oxalate crystals FEW (*)     All other components within normal limits   LIPID PANEL - Abnormal; Notable for the following:     LDL, calculated 105.4 (*)     All other components within normal limits   TSH 3RD GENERATION   CBC WITH AUTOMATED DIFF   METABOLIC PANEL, COMPREHENSIVE   ETHYL ALCOHOL   GLUCOSE, FASTING   HEMOGLOBIN A1C WITH EAG   HCG URINE, QL. - POC     No results found for: DS35, PHEN, PHENO, PHENT, DILF, DS39, PHENY, PTN, VALF2, VALAC, VALP, VALPR, DS6, CRBAM, CRBAMP, CARB2, XCRBAM  Admission on 05/09/2018, Discharged on 05/18/2018   Component Date Value Ref Range Status    TSH 05/09/2018 0.65  0.36 - 3.74 uIU/mL Final    Pregnancy test,urine (POC) 05/09/2018 NEGATIVE   NEG   Final    WBC 05/09/2018 8.6  3.6 - 11.0 K/uL Final    RBC 05/09/2018 4.03  3.80 - 5.20 M/uL Final    HGB 05/09/2018 12.7  11.5 - 16.0 g/dL Final    HCT 05/09/2018 39.5  35.0 - 47.0 % Final    MCV 05/09/2018 98.0  80.0 - 99.0 FL Final    MCH 05/09/2018 31.5  26.0 - 34.0 PG Final    MCHC 05/09/2018 32.2 30.0 - 36.5 g/dL Final    RDW 05/09/2018 13.0  11.5 - 14.5 % Final    PLATELET 95/91/2927 661  150 - 400 K/uL Final    MPV 05/09/2018 9.2  8.9 - 12.9 FL Final    NRBC 05/09/2018 0.0  0  WBC Final    ABSOLUTE NRBC 05/09/2018 0.00  0.00 - 0.01 K/uL Final    NEUTROPHILS 05/09/2018 58  32 - 75 % Final    LYMPHOCYTES 05/09/2018 32  12 - 49 % Final    MONOCYTES 05/09/2018 6  5 - 13 % Final    EOSINOPHILS 05/09/2018 4  0 - 7 % Final    BASOPHILS 05/09/2018 1  0 - 1 % Final    IMMATURE GRANULOCYTES 05/09/2018 0  0.0 - 0.5 % Final    ABS. NEUTROPHILS 05/09/2018 5.0  1.8 - 8.0 K/UL Final    ABS. LYMPHOCYTES 05/09/2018 2.7  0.8 - 3.5 K/UL Final    ABS. MONOCYTES 05/09/2018 0.5  0.0 - 1.0 K/UL Final    ABS. EOSINOPHILS 05/09/2018 0.3  0.0 - 0.4 K/UL Final    ABS. BASOPHILS 05/09/2018 0.0  0.0 - 0.1 K/UL Final    ABS. IMM. GRANS. 05/09/2018 0.0  0.00 - 0.04 K/UL Final    DF 05/09/2018 AUTOMATED    Final    Sodium 05/09/2018 141  136 - 145 mmol/L Final    Potassium 05/09/2018 4.0  3.5 - 5.1 mmol/L Final    Chloride 05/09/2018 105  97 - 108 mmol/L Final    CO2 05/09/2018 26  21 - 32 mmol/L Final    Anion gap 05/09/2018 10  5 - 15 mmol/L Final    Glucose 05/09/2018 75  65 - 100 mg/dL Final    BUN 05/09/2018 13  6 - 20 MG/DL Final    Creatinine 05/09/2018 0.67  0.55 - 1.02 MG/DL Final    BUN/Creatinine ratio 05/09/2018 19  12 - 20   Final    GFR est AA 05/09/2018 >60  >60 ml/min/1.73m2 Final    GFR est non-AA 05/09/2018 >60  >60 ml/min/1.73m2 Final    Calcium 05/09/2018 8.7  8.5 - 10.1 MG/DL Final    Bilirubin, total 05/09/2018 0.3  0.2 - 1.0 MG/DL Final    ALT (SGPT) 05/09/2018 26  12 - 78 U/L Final    AST (SGOT) 05/09/2018 15  15 - 37 U/L Final    Alk.  phosphatase 05/09/2018 83  45 - 117 U/L Final    Protein, total 05/09/2018 6.8  6.4 - 8.2 g/dL Final    Albumin 05/09/2018 3.5  3.5 - 5.0 g/dL Final    Globulin 05/09/2018 3.3  2.0 - 4.0 g/dL Final    A-G Ratio 05/09/2018 1.1  1.1 - 2.2 Final    Acetaminophen level 05/09/2018 <2* 10 - 30 ug/mL Final    ALCOHOL(ETHYL),SERUM 05/09/2018 <10  <10 MG/DL Final    Salicylate level 00/02/9436 <1.7* 2.8 - 20.0 MG/DL Final    Color 05/09/2018 YELLOW/STRAW    Final    Appearance 05/09/2018 TURBID* CLEAR   Final    Specific gravity 05/09/2018 1.021  1.003 - 1.030   Final    pH (UA) 05/09/2018 6.5  5.0 - 8.0   Final    Protein 05/09/2018 NEGATIVE   NEG mg/dL Final    Glucose 05/09/2018 NEGATIVE   NEG mg/dL Final    Ketone 05/09/2018 NEGATIVE   NEG mg/dL Final    Bilirubin 05/09/2018 NEGATIVE   NEG   Final    Blood 05/09/2018 NEGATIVE   NEG   Final    Urobilinogen 05/09/2018 1.0  0.2 - 1.0 EU/dL Final    Nitrites 05/09/2018 NEGATIVE   NEG   Final    Leukocyte Esterase 05/09/2018 LARGE* NEG   Final    WBC 05/09/2018 >100* 0 - 4 /hpf Final    RBC 05/09/2018 0-5  0 - 5 /hpf Final    Epithelial cells 05/09/2018 FEW  FEW /lpf Final    Bacteria 05/09/2018 3+* NEG /hpf Final    CA Oxalate crystals 05/09/2018 FEW* NEG   Final    Special Requests: 05/09/2018 NO SPECIAL REQUESTS    Final    [place] Count 05/09/2018     Final                    Value:>100,000  COLONIES/mL      Culture result: 05/09/2018 ESCHERICHIA COLI*   Final    Culture result: 05/09/2018 ENTEROBACTER CLOACAE*   Final    LIPID PROFILE 05/12/2018        Final    Cholesterol, total 05/12/2018 183  <200 MG/DL Final    Triglyceride 05/12/2018 133  <150 MG/DL Final    HDL Cholesterol 05/12/2018 51  MG/DL Final    LDL, calculated 05/12/2018 105.4* 0 - 100 MG/DL Final    VLDL, calculated 05/12/2018 26.6  MG/DL Final    CHOL/HDL Ratio 05/12/2018 3.6  0 - 5.0   Final    Glucose 05/12/2018 98  65 - 100 MG/DL Final    Hemoglobin A1c 05/12/2018 5.8  4.2 - 6.3 % Final    Est. average glucose 05/12/2018 120  mg/dL Final     No results found. DISPOSITION:    Home. Patient to f/u with drug/etoh rehabilitation, psychiatric, and psychotherapy appointments.  Patient is to f/u with internist as directed. FOLLOW-UP CARE:    Activity as tolerated  Regular Diet  Wound Care: none needed. Follow-up Information     Follow up With Details Comments [address] on 5/18/2018 plan to go to [employer] to complete assessment and open your case  [id number] [place] [address] Final  [phone]    None   None ([phone]) Patient stated that they have no PCP                   PROGNOSIS:   Good -- based on nature of patient's pathology/ies and treatment compliance issues. Prognosis is greatly dependent upon patient's ability to remain sober and to follow up with drug/etoh rehabilitation and psychiatric/psychotherapy appointments as well as to comply with psychiatric medications as prescribed. DISCHARGE MEDICATIONS:     Informed consent given for the use of following psychotropic medications:  Discharge Medication List as of 5/18/2018  9:37 AM      START taking these medications    Details   haloperidol (HALDOL) 10 mg tablet Take 1 Tab by mouth two (2) times a day. Indications: Psychotic Disorder, Normal, Disp-30 Tab, R-1                    A coordinated, multidisplinary treatment team round was conducted with Doreen Castano---this is done daily here at [facility]. This team consists of the nurse, psychiatric unit pharmcist,  and writer. I have spent greater than 35 minutes on discharge work.     Signed:  Marly Boggs MD
